# Patient Record
Sex: FEMALE | HISPANIC OR LATINO | Employment: FULL TIME | URBAN - METROPOLITAN AREA
[De-identification: names, ages, dates, MRNs, and addresses within clinical notes are randomized per-mention and may not be internally consistent; named-entity substitution may affect disease eponyms.]

---

## 2018-02-15 ENCOUNTER — APPOINTMENT (OUTPATIENT)
Dept: CT IMAGING | Facility: CLINIC | Age: 33
DRG: 872 | End: 2018-02-15
Attending: EMERGENCY MEDICINE
Payer: COMMERCIAL

## 2018-02-15 ENCOUNTER — APPOINTMENT (OUTPATIENT)
Dept: GENERAL RADIOLOGY | Facility: CLINIC | Age: 33
DRG: 872 | End: 2018-02-15
Attending: EMERGENCY MEDICINE
Payer: COMMERCIAL

## 2018-02-15 ENCOUNTER — HOSPITAL ENCOUNTER (INPATIENT)
Facility: CLINIC | Age: 33
LOS: 3 days | Discharge: HOME | DRG: 872 | End: 2018-02-18
Attending: EMERGENCY MEDICINE | Admitting: INTERNAL MEDICINE
Payer: COMMERCIAL

## 2018-02-15 DIAGNOSIS — R10.31 ABDOMINAL PAIN, RIGHT LOWER QUADRANT: ICD-10-CM

## 2018-02-15 DIAGNOSIS — N39.0 URINARY TRACT INFECTION WITHOUT HEMATURIA, SITE UNSPECIFIED: Primary | ICD-10-CM

## 2018-02-15 DIAGNOSIS — J11.1 INFLUENZA-LIKE ILLNESS: ICD-10-CM

## 2018-02-15 PROBLEM — R68.89 FLU-LIKE SYMPTOMS: Status: ACTIVE | Noted: 2018-02-15

## 2018-02-15 LAB
ALBUMIN SERPL-MCNC: 3.2 G/DL (ref 3.4–5)
ALBUMIN UR-MCNC: 10 MG/DL
ALP SERPL-CCNC: 62 U/L (ref 40–150)
ALT SERPL W P-5'-P-CCNC: 26 U/L (ref 0–50)
ANION GAP SERPL CALCULATED.3IONS-SCNC: 8 MMOL/L (ref 3–14)
APPEARANCE UR: ABNORMAL
AST SERPL W P-5'-P-CCNC: 15 U/L (ref 0–45)
BACTERIA #/AREA URNS HPF: ABNORMAL /HPF
BASOPHILS # BLD AUTO: 0 10E9/L (ref 0–0.2)
BASOPHILS NFR BLD AUTO: 0.4 %
BILIRUB SERPL-MCNC: 0.4 MG/DL (ref 0.2–1.3)
BILIRUB UR QL STRIP: NEGATIVE
BUN SERPL-MCNC: 8 MG/DL (ref 7–30)
CALCIUM SERPL-MCNC: 8.2 MG/DL (ref 8.5–10.1)
CHLORIDE SERPL-SCNC: 106 MMOL/L (ref 94–109)
CO2 BLDCOV-SCNC: 20 MMOL/L (ref 21–28)
CO2 SERPL-SCNC: 24 MMOL/L (ref 20–32)
COLOR UR AUTO: YELLOW
CREAT BLD-MCNC: 0.5 MG/DL (ref 0.52–1.04)
CREAT SERPL-MCNC: 0.56 MG/DL (ref 0.52–1.04)
CRP SERPL-MCNC: 159 MG/L (ref 0–8)
DIFFERENTIAL METHOD BLD: ABNORMAL
EOSINOPHIL # BLD AUTO: 0 10E9/L (ref 0–0.7)
EOSINOPHIL NFR BLD AUTO: 0 %
ERYTHROCYTE [DISTWIDTH] IN BLOOD BY AUTOMATED COUNT: 13.6 % (ref 10–15)
FLUAV+FLUBV AG SPEC QL: NEGATIVE
FLUAV+FLUBV AG SPEC QL: NEGATIVE
FLUAV+FLUBV RNA SPEC QL NAA+PROBE: NEGATIVE
FLUAV+FLUBV RNA SPEC QL NAA+PROBE: NEGATIVE
GFR SERPL CREATININE-BSD FRML MDRD: >90 ML/MIN/1.7M2
GFR SERPL CREATININE-BSD FRML MDRD: >90 ML/MIN/1.7M2
GLUCOSE SERPL-MCNC: 115 MG/DL (ref 70–99)
GLUCOSE UR STRIP-MCNC: NEGATIVE MG/DL
HCT VFR BLD AUTO: 35.5 % (ref 35–47)
HGB BLD-MCNC: 11.5 G/DL (ref 11.7–15.7)
HGB UR QL STRIP: ABNORMAL
HYALINE CASTS #/AREA URNS LPF: 1 /LPF (ref 0–2)
IMM GRANULOCYTES # BLD: 0 10E9/L (ref 0–0.4)
IMM GRANULOCYTES NFR BLD: 0.2 %
KETONES UR STRIP-MCNC: 80 MG/DL
LACTATE BLD-SCNC: 1 MMOL/L (ref 0.7–2.1)
LEUKOCYTE ESTERASE UR QL STRIP: ABNORMAL
LYMPHOCYTES # BLD AUTO: 0.4 10E9/L (ref 0.8–5.3)
LYMPHOCYTES NFR BLD AUTO: 4.6 %
MCH RBC QN AUTO: 26.6 PG (ref 26.5–33)
MCHC RBC AUTO-ENTMCNC: 32.4 G/DL (ref 31.5–36.5)
MCV RBC AUTO: 82 FL (ref 78–100)
MONOCYTES # BLD AUTO: 0.6 10E9/L (ref 0–1.3)
MONOCYTES NFR BLD AUTO: 7.3 %
MUCOUS THREADS #/AREA URNS LPF: PRESENT /LPF
NEUTROPHILS # BLD AUTO: 7 10E9/L (ref 1.6–8.3)
NEUTROPHILS NFR BLD AUTO: 87.5 %
NITRATE UR QL: POSITIVE
NRBC # BLD AUTO: 0 10*3/UL
NRBC BLD AUTO-RTO: 0 /100
PCO2 BLDV: 28 MM HG (ref 40–50)
PH BLDV: 7.46 PH (ref 7.32–7.43)
PH UR STRIP: 6 PH (ref 5–7)
PLATELET # BLD AUTO: 166 10E9/L (ref 150–450)
PO2 BLDV: 31 MM HG (ref 25–47)
POTASSIUM SERPL-SCNC: 3.3 MMOL/L (ref 3.4–5.3)
PROCALCITONIN SERPL-MCNC: 0.66 NG/ML
PROT SERPL-MCNC: 7.3 G/DL (ref 6.8–8.8)
RBC # BLD AUTO: 4.32 10E12/L (ref 3.8–5.2)
RBC #/AREA URNS AUTO: 4 /HPF (ref 0–2)
RSV RNA SPEC NAA+PROBE: NEGATIVE
SAO2 % BLDV FROM PO2: 65 %
SODIUM SERPL-SCNC: 138 MMOL/L (ref 133–144)
SOURCE: ABNORMAL
SP GR UR STRIP: 1.01 (ref 1–1.03)
SPECIMEN SOURCE: NORMAL
SPECIMEN SOURCE: NORMAL
SQUAMOUS #/AREA URNS AUTO: 4 /HPF (ref 0–1)
UROBILINOGEN UR STRIP-MCNC: NORMAL MG/DL (ref 0–2)
WBC # BLD AUTO: 8 10E9/L (ref 4–11)
WBC #/AREA URNS AUTO: 50 /HPF (ref 0–2)

## 2018-02-15 PROCEDURE — 99285 EMERGENCY DEPT VISIT HI MDM: CPT | Mod: 25 | Performed by: EMERGENCY MEDICINE

## 2018-02-15 PROCEDURE — 25000132 ZZH RX MED GY IP 250 OP 250 PS 637: Performed by: PHYSICIAN ASSISTANT

## 2018-02-15 PROCEDURE — 36415 COLL VENOUS BLD VENIPUNCTURE: CPT | Performed by: PHYSICIAN ASSISTANT

## 2018-02-15 PROCEDURE — 85025 COMPLETE CBC W/AUTO DIFF WBC: CPT | Performed by: EMERGENCY MEDICINE

## 2018-02-15 PROCEDURE — 25000128 H RX IP 250 OP 636: Performed by: EMERGENCY MEDICINE

## 2018-02-15 PROCEDURE — 87086 URINE CULTURE/COLONY COUNT: CPT | Performed by: EMERGENCY MEDICINE

## 2018-02-15 PROCEDURE — 99285 EMERGENCY DEPT VISIT HI MDM: CPT | Mod: Z6 | Performed by: EMERGENCY MEDICINE

## 2018-02-15 PROCEDURE — 27210995 ZZH RX 272: Performed by: PHYSICIAN ASSISTANT

## 2018-02-15 PROCEDURE — 25000132 ZZH RX MED GY IP 250 OP 250 PS 637: Performed by: EMERGENCY MEDICINE

## 2018-02-15 PROCEDURE — 83605 ASSAY OF LACTIC ACID: CPT

## 2018-02-15 PROCEDURE — 87040 BLOOD CULTURE FOR BACTERIA: CPT | Performed by: EMERGENCY MEDICINE

## 2018-02-15 PROCEDURE — 96375 TX/PRO/DX INJ NEW DRUG ADDON: CPT | Mod: 59 | Performed by: EMERGENCY MEDICINE

## 2018-02-15 PROCEDURE — 74177 CT ABD & PELVIS W/CONTRAST: CPT

## 2018-02-15 PROCEDURE — 12000001 ZZH R&B MED SURG/OB UMMC

## 2018-02-15 PROCEDURE — 82803 BLOOD GASES ANY COMBINATION: CPT

## 2018-02-15 PROCEDURE — 87088 URINE BACTERIA CULTURE: CPT | Performed by: EMERGENCY MEDICINE

## 2018-02-15 PROCEDURE — 71046 X-RAY EXAM CHEST 2 VIEWS: CPT

## 2018-02-15 PROCEDURE — 87186 SC STD MICRODIL/AGAR DIL: CPT | Performed by: EMERGENCY MEDICINE

## 2018-02-15 PROCEDURE — 81001 URINALYSIS AUTO W/SCOPE: CPT | Performed by: EMERGENCY MEDICINE

## 2018-02-15 PROCEDURE — 96361 HYDRATE IV INFUSION ADD-ON: CPT | Performed by: EMERGENCY MEDICINE

## 2018-02-15 PROCEDURE — 99223 1ST HOSP IP/OBS HIGH 75: CPT | Mod: AI | Performed by: INTERNAL MEDICINE

## 2018-02-15 PROCEDURE — 99207 ZZC APP CREDIT; MD BILLING SHARED VISIT: CPT | Performed by: PHYSICIAN ASSISTANT

## 2018-02-15 PROCEDURE — 25000128 H RX IP 250 OP 636: Performed by: PHYSICIAN ASSISTANT

## 2018-02-15 PROCEDURE — 87804 INFLUENZA ASSAY W/OPTIC: CPT | Performed by: EMERGENCY MEDICINE

## 2018-02-15 PROCEDURE — 96374 THER/PROPH/DIAG INJ IV PUSH: CPT | Mod: 59 | Performed by: EMERGENCY MEDICINE

## 2018-02-15 PROCEDURE — 25000125 ZZHC RX 250: Performed by: EMERGENCY MEDICINE

## 2018-02-15 PROCEDURE — 27210995 ZZH RX 272: Performed by: EMERGENCY MEDICINE

## 2018-02-15 PROCEDURE — 82565 ASSAY OF CREATININE: CPT

## 2018-02-15 PROCEDURE — 86140 C-REACTIVE PROTEIN: CPT | Performed by: PHYSICIAN ASSISTANT

## 2018-02-15 PROCEDURE — 99207 ZZC CDG-CHARGE REQUIRED MANUAL ENTRY: CPT | Performed by: INTERNAL MEDICINE

## 2018-02-15 PROCEDURE — 87633 RESP VIRUS 12-25 TARGETS: CPT | Performed by: PHYSICIAN ASSISTANT

## 2018-02-15 PROCEDURE — 84145 PROCALCITONIN (PCT): CPT | Performed by: PHYSICIAN ASSISTANT

## 2018-02-15 PROCEDURE — 80053 COMPREHEN METABOLIC PANEL: CPT | Performed by: EMERGENCY MEDICINE

## 2018-02-15 PROCEDURE — 87631 RESP VIRUS 3-5 TARGETS: CPT | Performed by: EMERGENCY MEDICINE

## 2018-02-15 RX ORDER — ACETAMINOPHEN 325 MG/1
650 TABLET ORAL EVERY 4 HOURS PRN
Status: DISCONTINUED | OUTPATIENT
Start: 2018-02-15 | End: 2018-02-18 | Stop reason: HOSPADM

## 2018-02-15 RX ORDER — POTASSIUM CHLORIDE 1.5 G/1.58G
20-40 POWDER, FOR SOLUTION ORAL
Status: DISCONTINUED | OUTPATIENT
Start: 2018-02-15 | End: 2018-02-18 | Stop reason: HOSPADM

## 2018-02-15 RX ORDER — IOPAMIDOL 755 MG/ML
71 INJECTION, SOLUTION INTRAVASCULAR ONCE
Status: COMPLETED | OUTPATIENT
Start: 2018-02-15 | End: 2018-02-15

## 2018-02-15 RX ORDER — SODIUM CHLORIDE 9 MG/ML
INJECTION, SOLUTION INTRAVENOUS CONTINUOUS
Status: DISCONTINUED | OUTPATIENT
Start: 2018-02-15 | End: 2018-02-18 | Stop reason: HOSPADM

## 2018-02-15 RX ORDER — POTASSIUM CHLORIDE 750 MG/1
20-40 TABLET, EXTENDED RELEASE ORAL
Status: DISCONTINUED | OUTPATIENT
Start: 2018-02-15 | End: 2018-02-18 | Stop reason: HOSPADM

## 2018-02-15 RX ORDER — KETOROLAC TROMETHAMINE 30 MG/ML
30 INJECTION, SOLUTION INTRAMUSCULAR; INTRAVENOUS EVERY 6 HOURS PRN
Status: DISCONTINUED | OUTPATIENT
Start: 2018-02-15 | End: 2018-02-16

## 2018-02-15 RX ORDER — OSELTAMIVIR PHOSPHATE 75 MG/1
75 CAPSULE ORAL 2 TIMES DAILY
Status: DISCONTINUED | OUTPATIENT
Start: 2018-02-15 | End: 2018-02-18 | Stop reason: HOSPADM

## 2018-02-15 RX ORDER — AMOXICILLIN 250 MG
1 CAPSULE ORAL AT BEDTIME
Status: DISCONTINUED | OUTPATIENT
Start: 2018-02-15 | End: 2018-02-18 | Stop reason: HOSPADM

## 2018-02-15 RX ORDER — ONDANSETRON 2 MG/ML
4 INJECTION INTRAMUSCULAR; INTRAVENOUS EVERY 6 HOURS PRN
Status: DISCONTINUED | OUTPATIENT
Start: 2018-02-15 | End: 2018-02-18 | Stop reason: HOSPADM

## 2018-02-15 RX ORDER — ONDANSETRON 4 MG/1
4 TABLET, ORALLY DISINTEGRATING ORAL EVERY 6 HOURS PRN
Status: DISCONTINUED | OUTPATIENT
Start: 2018-02-15 | End: 2018-02-18 | Stop reason: HOSPADM

## 2018-02-15 RX ORDER — NALOXONE HYDROCHLORIDE 0.4 MG/ML
.1-.4 INJECTION, SOLUTION INTRAMUSCULAR; INTRAVENOUS; SUBCUTANEOUS
Status: DISCONTINUED | OUTPATIENT
Start: 2018-02-15 | End: 2018-02-18 | Stop reason: HOSPADM

## 2018-02-15 RX ORDER — OSELTAMIVIR PHOSPHATE 75 MG/1
75 CAPSULE ORAL ONCE
Status: COMPLETED | OUTPATIENT
Start: 2018-02-15 | End: 2018-02-15

## 2018-02-15 RX ORDER — BISACODYL 10 MG
10 SUPPOSITORY, RECTAL RECTAL DAILY PRN
Status: DISCONTINUED | OUTPATIENT
Start: 2018-02-15 | End: 2018-02-18 | Stop reason: HOSPADM

## 2018-02-15 RX ORDER — SODIUM CHLORIDE 9 MG/ML
INJECTION, SOLUTION INTRAVENOUS CONTINUOUS
Status: DISCONTINUED | OUTPATIENT
Start: 2018-02-15 | End: 2018-02-15

## 2018-02-15 RX ORDER — POLYETHYLENE GLYCOL 3350 17 G/17G
17 POWDER, FOR SOLUTION ORAL DAILY PRN
Status: DISCONTINUED | OUTPATIENT
Start: 2018-02-15 | End: 2018-02-18 | Stop reason: HOSPADM

## 2018-02-15 RX ORDER — IBUPROFEN 600 MG/1
600 TABLET, FILM COATED ORAL EVERY 8 HOURS PRN
Status: DISCONTINUED | OUTPATIENT
Start: 2018-02-15 | End: 2018-02-15

## 2018-02-15 RX ORDER — KETOROLAC TROMETHAMINE 15 MG/ML
15 INJECTION, SOLUTION INTRAMUSCULAR; INTRAVENOUS ONCE
Status: COMPLETED | OUTPATIENT
Start: 2018-02-15 | End: 2018-02-15

## 2018-02-15 RX ORDER — HYDROMORPHONE HYDROCHLORIDE 1 MG/ML
0.5 INJECTION, SOLUTION INTRAMUSCULAR; INTRAVENOUS; SUBCUTANEOUS ONCE
Status: COMPLETED | OUTPATIENT
Start: 2018-02-15 | End: 2018-02-15

## 2018-02-15 RX ADMIN — KETOROLAC TROMETHAMINE 15 MG: 15 INJECTION, SOLUTION INTRAMUSCULAR; INTRAVENOUS at 06:54

## 2018-02-15 RX ADMIN — IOPAMIDOL 71 ML: 755 INJECTION, SOLUTION INTRAVENOUS at 08:07

## 2018-02-15 RX ADMIN — SODIUM CHLORIDE 1000 ML: 9 INJECTION, SOLUTION INTRAVENOUS at 07:41

## 2018-02-15 RX ADMIN — SODIUM CHLORIDE 1000 ML: 9 INJECTION, SOLUTION INTRAVENOUS at 06:54

## 2018-02-15 RX ADMIN — Medication 0.5 MG: at 07:41

## 2018-02-15 RX ADMIN — CEFTRIAXONE SODIUM 1 G: 10 INJECTION, POWDER, FOR SOLUTION INTRAVENOUS at 08:34

## 2018-02-15 RX ADMIN — SODIUM CHLORIDE 58 ML: 9 INJECTION, SOLUTION INTRAVENOUS at 08:07

## 2018-02-15 RX ADMIN — KETOROLAC TROMETHAMINE 30 MG: 30 INJECTION, SOLUTION INTRAMUSCULAR at 20:53

## 2018-02-15 RX ADMIN — OSELTAMIVIR PHOSPHATE 75 MG: 75 CAPSULE ORAL at 20:40

## 2018-02-15 RX ADMIN — ACETAMINOPHEN 650 MG: 325 TABLET, FILM COATED ORAL at 20:52

## 2018-02-15 RX ADMIN — CEFTRIAXONE SODIUM 1 G: 10 INJECTION, POWDER, FOR SOLUTION INTRAVENOUS at 13:07

## 2018-02-15 RX ADMIN — KETOROLAC TROMETHAMINE 30 MG: 30 INJECTION, SOLUTION INTRAMUSCULAR at 13:05

## 2018-02-15 RX ADMIN — OSELTAMIVIR PHOSPHATE 75 MG: 75 CAPSULE ORAL at 08:34

## 2018-02-15 RX ADMIN — SENNOSIDES AND DOCUSATE SODIUM 1 TABLET: 8.6; 5 TABLET ORAL at 21:06

## 2018-02-15 RX ADMIN — ACETAMINOPHEN 650 MG: 325 TABLET, FILM COATED ORAL at 16:38

## 2018-02-15 RX ADMIN — SODIUM CHLORIDE: 9 INJECTION, SOLUTION INTRAVENOUS at 09:07

## 2018-02-15 NOTE — PLAN OF CARE
Problem: Infection, Risk/Actual (Adult)  Goal: Identify Related Risk Factors and Signs and Symptoms  Related risk factors and signs and symptoms are identified upon initiation of Human Response Clinical Practice Guideline (CPG).  Outcome: No Change    VS: Tachycardic    O2: Mid 90's on RA    Output: Voiding adequate amts in bathroom    Last BM: 2/14   Activity: Ambulated with SBA    Skin: WDL    Pain: Moderately controlled with Toradol and acetaminophen    CMS: Intact    Dressing: NA    Diet: Tolerating regular diet with minimal appetite.    LDA: PIV infusing NS    Equipment: NA    Plan: Continue to determine etiology and await cultures.    Additional Info:

## 2018-02-15 NOTE — ED PROVIDER NOTES
History     Chief Complaint   Patient presents with     Flu Symptoms     started on monday and getting worst, took tylenol and ibuprofen around midnight, chills, fever, headache and body aches     HPI  Minnie Arambula is a 32 year old female who presents with cough fever headache and body aches.  She has had these symptoms for the past 2 days.  Tonight she started to feel worse and presented to the ER.  She has no sick contacts and no recent travel.  She did not receive her influenza vaccine.  She reports some urinary frequency and and also some nausea and vomiting.  Her immunizations are current.  She has no chest pain or shortness of breath.    PAST MEDICAL HISTORY:   Past Medical History:   Diagnosis Date     Fibroid uterus      Giant cell tumor of bone     Left Femur in        PAST SURGICAL HISTORY:   Past Surgical History:   Procedure Laterality Date      SECTION      X4     LEG SURGERY      left     REMOVE HARDWARE LOWER EXTREMITY  2011    Procedure:REMOVE HARDWARE LOWER EXTREMITY; Plate Removal Distal Femur; Surgeon:YESY WALLS; Location:[facility] OR     TUBAL LIGATION         FAMILY HISTORY:   Family History   Problem Relation Age of Onset     DIABETES Father      Hypertension Mother        SOCIAL HISTORY:   Social History   Substance Use Topics     Smoking status: Never Smoker     Smokeless tobacco: Never Used     Alcohol use Yes      Comment: occ       Patient's Medications   New Prescriptions    No medications on file   Previous Medications    HYDROCODONE-ACETAMINOPHEN (NORCO) 5-325 MG PER TABLET    Take 1-2 tablets by mouth every 4 hours as needed for moderate to severe pain    IBUPROFEN (ADVIL,MOTRIN) 600 MG TABLET    Take 1 tablet (600 mg) by mouth every 8 hours as needed for moderate pain    POLYETHYLENE GLYCOL (MIRALAX) POWDER    Take 17 g (1 capful) by mouth daily   Modified Medications    No medications on file   Discontinued Medications    No medications on file           Allergies   Allergen Reactions     Pcn [Bicillin C-R,] Shortness Of Breath     Sulfa Drugs Rash         I have reviewed the Medications, Allergies, Past Medical and Surgical History, and Social History in the Epic system.    Review of Systems   All other systems reviewed and are negative.      Physical Exam   BP: 115/64  Heart Rate: 126  Temp: 102.9  F (39.4  C)  Resp: 20  SpO2: 99 %      Physical Exam   Constitutional: She is oriented to person, place, and time. No distress.   HENT:   Head: Normocephalic and atraumatic.   Right Ear: External ear normal.   Left Ear: External ear normal.   Mouth/Throat: Oropharynx is clear and moist. No oropharyngeal exudate.   Eyes: Conjunctivae are normal. Pupils are equal, round, and reactive to light.   Neck: Normal range of motion. Neck supple.   Cardiovascular: Intact distal pulses.    tachycardic   Pulmonary/Chest: Effort normal. No respiratory distress. She has no wheezes. She has no rales. She exhibits no tenderness.   Abdominal: There is no tenderness. There is no rebound and no guarding.   Right lower abdominal tenderness   Musculoskeletal: Normal range of motion. She exhibits no edema or tenderness.   Neurological: She is alert and oriented to person, place, and time. She exhibits normal muscle tone.   Skin: Skin is warm and dry. No rash noted. She is not diaphoretic.   Psychiatric: She has a normal mood and affect. Her behavior is normal. Thought content normal.   Nursing note and vitals reviewed.      ED Course     ED Course     Procedures                Critical Care time:  none   2          Labs Ordered and Resulted from Time of ED Arrival Up to the Time of Departure from the ED   CBC WITH PLATELETS DIFFERENTIAL   COMPREHENSIVE METABOLIC PANEL   ROUTINE UA WITH MICROSCOPIC   ISTAT CG4 GASES LACTATE ITALO NURSING POCT   ISTAT CREATININE NURSING POCT   INFLUENZA A/B ANTIGEN   BLOOD CULTURE   URINE CULTURE AEROBIC BACTERIAL            Assessments & Plan (with  Medical Decision Making)   1.  UTI    32-year-old female who presents with fever, body aches and headache.  On examination, she is febrile to 102.9 and tachycardic in the 120s.  She is normotensive.  Influenza is negative, but still suspicious for an influenza like illness, and will treat with Tamiflu.  An Influenza and RSV PCR is pending.  Her lab evaluation was otherwise normal with a lactic acid of 1.0 and a WBC of 8.0.  On her exam, she demonstrated some right sided tenderness and we will obtain a CT AP to further evaluate. Urinalysis consistent with infection with 50 WBCs Pos Nitrites and + LE.  WIll treat with Rocephin.   Minnie felt improved after IV dilaudid and Toradol.  Blood and urine cultures are pending.  Will sign out patient to oncoming provider to follow up CT AP prior to admission.             I have reviewed the nursing notes.    I have reviewed the findings, diagnosis, plan and need for follow up with the patient.    New Prescriptions    No medications on file       Final diagnoses:   None       2/15/2018   [facility], [place], EMERGENCY DEPARTMENT     Catalino Allen MD  02/21/18 2053       Catalino Allen MD  02/21/18 2054

## 2018-02-15 NOTE — IP AVS SNAPSHOT
MRN:[id number]                      After Visit Summary   2/15/2018    Minnie Arambula    MRN: [id number]           Thank you!     Thank you for choosing [facility] for your care. Our goal is always to provide you with excellent care. Hearing back from our patients is one way we can continue to improve our services. Please take a few minutes to complete the written survey that you may receive in the mail after you visit with us. Thank you!        Patient Information     Date Of Birth          1985        Designated Caregiver       Most Recent Value    Caregiver    Will someone help with your care after discharge? no      About your hospital stay     You were admitted on:  February 15, 2018 You last received care in the:  [facility]    You were discharged on:  February 18, 2018        Reason for your hospital stay       You were hospitalized with a high fever, likely secondary to a urinary tract infection.                  Who to Call     For medical emergencies, please call 911.  For non-urgent questions about your medical care, please call your primary care provider or clinic, [phone]          Attending Provider     Provider Specialty    Catalino Allen MD Emergency Medicine    Ene Acuna MD Emergency Medicine    Carol, Elier Kemp MD Internal Medicine       Primary Care Provider Office Phone # Fax #    Laurie BallNA [phone]      Follow-up Appointments     Adult [facility] Follow-up and recommended labs and tests       Call primary provider if you develop recurrence of abdominal or flank pain, fevers, burning with urination or severe diarrhea  You should see your primary provider in 1-2 weeks for a routine follow up  You should have a hemoglobin rechecked, as your blood count was low (9.0) in the setting of acute infection.                  Pending Results     Date and Time Order Name Status Description    2/15/2018 0623 Blood culture ONE site  "Preliminary             Statement of Approval     Ordered          18 0927  I have reviewed and agree with all the recommendations and orders detailed in this document.  EFFECTIVE NOW     Approved and electronically signed by:  Elier Medina MD             Admission Information     Date & Time Provider Department Dept. Phone    2/15/2018 Elier Medina MD [facility] [phone]      Your Vitals Were     Blood Pressure Pulse Temperature Respirations Last Period Pulse Oximetry    108/71 (BP Location: Right arm) 84 98.7  F (37.1  C) (Oral) 14 2018 (Approximate) 95%      MyChart Information     [email or web address] lets you send messages to your doctor, view your test results, renew your prescriptions, schedule appointments and more. To sign up, go to [email or web address] . Click on \"Log in\" on the left side of the screen, which will take you to the Welcome page. Then click on \"Sign up Now\" on the right side of the page.     You will be asked to enter the access code listed below, as well as some personal information. Please follow the directions to create your username and password.     Your access code is: [email or web address]  Expires: 2018  9:35 AM     Your access code will  in 90 days. If you need help or a new code, please call your [facility] clinic or [phone].        Care EveryWhere ID     This is your Care EveryWhere ID. This could be used by other organizations to access your [facility] medical records  [id number]        Equal Access to Services     [facility]: Hadii aad ku hadasho Soomaali, waaxda luqadaha, qaybta kaalmada adeegyada, fe rankin . So [facility] [phone].    ATENCIÓN: Si habla español, tiene a stuart disposición servicios gratuitos de asistencia lingüística. Llame al [phone].    We comply with applicable federal civil rights laws and [place] laws. We do not discriminate on the basis of race, color, national origin, age, disability, sex, " sexual orientation, or gender identity.               Review of your medicines      START taking        Dose / Directions    ciprofloxacin 500 MG tablet   Commonly known as:  CIPRO   Used for:  Urinary tract infection without hematuria, site unspecified        Dose:  500 mg   Take 1 tablet (500 mg) by mouth 2 times daily   Quantity:  14 tablet   Refills:  0         CONTINUE these medicines which have NOT CHANGED        Dose / Directions    ibuprofen 600 MG tablet   Commonly known as:  ADVIL/MOTRIN        Dose:  600 mg   Take 1 tablet (600 mg) by mouth every 8 hours as needed for moderate pain   Quantity:  20 tablet   Refills:  0         STOP taking     HYDROcodone-acetaminophen 5-325 MG per tablet   Commonly known as:  NORCO           polyethylene glycol powder   Commonly known as:  MIRALAX                Where to get your medicines      These medications were sent to [facility] [place], [place] - [address]  [address] [address]     Phone:  [phone]     ciprofloxacin 500 MG tablet                Protect others around you: Learn how to safely use, store and throw away your medicines at www.disposemymeds.org.        ANTIBIOTIC INSTRUCTION     You've Been Prescribed an Antibiotic - Now What?  Your healthcare team thinks that you or your loved one might have an infection. Some infections can be treated with antibiotics, which are powerful, life-saving drugs. Like all medications, antibiotics have side effects and should only be used when necessary. There are some important things you should know about your antibiotic treatment.      Your healthcare team may run tests before you start taking an antibiotic.    Your team may take samples (e.g., from your blood, urine or other areas) to run tests to look for bacteria. These test can be important to determine if you need an antibiotic at all and, if you do, which antibiotic will work best.      Within a few days, your  healthcare team might change or even stop your antibiotic.    Your team may start you on an antibiotic while they are working to find out what is making you sick.    Your team might change your antibiotic because test results show that a different antibiotic would be better to treat your infection.    In some cases, once your team has more information, they learn that you do not need an antibiotic at all. They may find out that you don't have an infection, or that the antibiotic you're taking won't work against your infection. For example, an infection caused by a virus can't be treated with antibiotics. Staying on an antibiotic when you don't need it is more likely to be harmful than helpful.      You may experience side effects from your antibiotic.    Like all medications, antibiotics have side effects. Some of these can be serious.    Let you healthcare team know if you have any known allergies when you are admitted to the hospital.    One significant side effect of nearly all antibiotics is the risk of severe and sometimes deadly diarrhea caused by Clostridium difficile (C. Difficile). This occurs when a person takes antibiotics because some good germs are destroyed. Antibiotic use allows C. diificile to take over, putting patients at high risk for this serious infection.    As a patient or caregiver, it is important to understand your or your loved one's antibiotic treatment. It is especially important for caregivers to speak up when patients can't speak for themselves. Here are some important questions to ask your healthcare team.    What infection is this antibiotic treating and how do you know I have that infection?    What side effects might occur from this antibiotic?    How long will I need to take this antibiotic?    Is it safe to take this antibiotic with other medications or supplements (e.g., vitamins) that I am taking?     Are there any special directions I need to know about taking this antibiotic?  For example, should I take it with food?    How will I be monitored to know whether my infection is responding to the antibiotic?    What tests may help to make sure the right antibiotic is prescribed for me?      Information provided by:  www.cdc.gov/getsmart  U.S. Department of Health and Human Services  Centers for disease Control and Prevention  National Center for Emerging and Zoonotic Infectious Diseases  Division of Healthcare Quality Promotion             Medication List: This is a list of all your medications and when to take them. Check marks below indicate your daily home schedule. Keep this list as a reference.      Medications           Morning Afternoon Evening Bedtime As Needed    ciprofloxacin 500 MG tablet   Commonly known as:  CIPRO   Take 1 tablet (500 mg) by mouth 2 times daily                                ibuprofen 600 MG tablet   Commonly known as:  ADVIL/MOTRIN   Take 1 tablet (600 mg) by mouth every 8 hours as needed for moderate pain   Last time this was given:  400 mg on 2/17/2018  4:22 PM

## 2018-02-15 NOTE — ED NOTES
[facility], [place]   ED Nurse to Floor Handoff     Minnie Arambula is a 32 year old female who speaks Citizen of the [place] and fluent in English, lives with a spouse,  in a home  They arrived in the ED by car from home    ED Chief Complaint: Flu Symptoms (started on monday and getting worst, took tylenol and ibuprofen around midnight, chills, fever, headache and body aches)    ED Dx;   Final diagnoses:   Influenza-like illness         Needed?: No    Allergies:   Allergies   Allergen Reactions     Pcn [Bicillin C-R,] Shortness Of Breath     Sulfa Drugs Rash   .  Past Medical Hx:   Past Medical History:   Diagnosis Date     Fibroid uterus      Giant cell tumor of bone     Left Femur in 2004      Baseline Mental status: WDL  Current Mental Status changes: at basesline    Infection: Yes  Sepsis suspected: No  Isolation type: Droplet     Activity level - Baseline/Home:  Independent  Activity Level - Current:   Independent    Bariatric equipment needed?: No    In the ED these meds were given:   Medications   0.9% sodium chloride infusion ( Intravenous New Bag 2/15/18 0907)   0.9% sodium chloride BOLUS (0 mLs Intravenous Stopped 2/15/18 0740)   ketorolac (TORADOL) injection 15 mg (15 mg Intravenous Given 2/15/18 0654)   0.9% sodium chloride BOLUS (1,000 mLs Intravenous New Bag 2/15/18 0741)   HYDROmorphone (PF) (DILAUDID) injection 0.5 mg (0.5 mg Intravenous Given 2/15/18 0741)   iopamidol (ISOVUE-370) solution 71 mL (71 mLs Intravenous Given 2/15/18 0807)   sodium chloride 0.9 % bag 500mL for CT scan flush use (58 mLs As instructed Given 2/15/18 0807)   oseltamivir (TAMIFLU) capsule 75 mg (75 mg Oral Given 2/15/18 0834)   cefTRIAXone (ROCEPHIN) 1 g in 10 mL SWFI Premix Syringe (1 g Intravenous Given 2/15/18 0834)       Drips running?  Yes    Home pump or pre-existing LDA's present? No    Labs results:   Labs Ordered and Resulted from Time of ED Arrival Up to the Time of Departure  from the ED   CBC WITH PLATELETS DIFFERENTIAL - Abnormal; Notable for the following:        Result Value    Hemoglobin 11.5 (*)     Absolute Lymphocytes 0.4 (*)     All other components within normal limits   COMPREHENSIVE METABOLIC PANEL - Abnormal; Notable for the following:     Potassium 3.3 (*)     Glucose 115 (*)     Calcium 8.2 (*)     Albumin 3.2 (*)     All other components within normal limits   ROUTINE UA WITH MICROSCOPIC - Abnormal; Notable for the following:     Ketones Urine 80 (*)     Blood Urine Trace (*)     Protein Albumin Urine 10 (*)     Nitrite Urine Positive (*)     Leukocyte Esterase Urine Moderate (*)     WBC Urine 50 (*)     RBC Urine 4 (*)     Bacteria Urine Many (*)     Squamous Epithelial /HPF Urine 4 (*)     Mucous Urine Present (*)     All other components within normal limits   CREATININE POCT - Abnormal; Notable for the following:     Creatinine 0.5 (*)     All other components within normal limits   ISTAT  GASES LACTATE ITALO POCT - Abnormal; Notable for the following:     Ph Venous 7.46 (*)     PCO2 Venous 28 (*)     Bicarbonate Venous 20 (*)     All other components within normal limits   ISTAT CG4 GASES LACTATE ITALO NURSING POCT   ISTAT CREATININE NURSING POCT   PATIENT CARE ORDER   INFLUENZA A/B ANTIGEN   BLOOD CULTURE   URINE CULTURE AEROBIC BACTERIAL   INFLUENZA A AND B AND RSV PCR       Imaging Studies:   Recent Results (from the past 24 hour(s))   CT Abdomen Pelvis w Contrast    Narrative    CT ABDOMEN AND PELVIS WITH CONTRAST  2/15/2018 8:15 AM    HISTORY: Right lower quadrant pain.     COMPARISON: A CT on 5/2/2016.    TECHNIQUE: Routine transverse CT imaging of the abdomen and pelvis was  performed following the uneventful administration of 71 mL Isovue 370  intravenous contrast. Radiation dose for this scan was reduced using  automated exposure control, adjustment of the mA and/or kV according  to patient size, or iterative reconstruction technique.    FINDINGS: There is mild  dependent atelectasis in both lung bases.  Again seen are a few tiny low-density lesions within the liver. These  are again too small to evaluate but are unchanged and likely represent  cysts. No other hepatic abnormality is noted. The spleen, pancreas,  gallbladder, and adrenal glands are normal. There has been development  of a few very small areas of decreased attenuation in the lateral and  superior aspect of the left upper kidney along the cortex. These are  too small to further evaluate but likely represents cysts. No other  renal abnormality is demonstrated. The bladder is unremarkable. Again  seen are several small uterine myomas. No enlarged lymph node or other  abnormal mass is demonstrated. No free fluid is seen. No free  intraperitoneal gas is identified. The gastrointestinal tract is  unremarkable. There is a normal-appearing appendix. No vascular  abnormality is seen. The osseous structures are unremarkable. No  abdominal or pelvic wall pathology is demonstrated.       Impression    IMPRESSION:   1. Again seen are several small uterine myomas.  2. Probable small left renal cysts.  3. Otherwise unremarkable examination.    ZOEY ALEJANDRO MD   Chest XR,  PA & LAT    Narrative    CHEST TWO VIEWS   2/15/2018 8:19 AM    HISTORY:  Fever.     COMPARISON:  2/17/2011      Impression    IMPRESSION:  Negative.        Recent vital signs:   BP 96/55  Pulse 103  Temp 99.2  F (37.3  C) (Oral)  Resp 18  LMP 02/01/2018 (Approximate)  SpO2 97%  Breastfeeding? No    Cardiac Rhythm: Tachycardia  Pt needs tele? No  Skin/wound Issues: None    Code Status: Full Code    Pain control: good    Nausea control: pt had none    Abnormal labs/tests/findings requiring intervention: UTI, started on antibiotics.     Family present during ED course? Yes   Family Comments/Social Situation comments: n/a    Tasks needing completion: None    Purnima Orellana, RN  [facility]-- [phone] [place] [facility]  [phone] [facility] ED

## 2018-02-15 NOTE — H&P
[facility], [place]    Internal Medicine History and Physical - [facility] Service       Date of Admission:  2/15/2018    Assessment & Plan   Minnie Arambula is a 32 year old female with a history of giant cell tumor of L femur (2004) and uterine fibroids admitted on 2/15/2018 for sepsis 2/2 suspected UTI and viral Illness of unknown etiology.    Sepsis 2/2 Suspected Urinary Tract Infection, Viral Illness. Endorses 4 day history of high fever, diffuse body aches, and dysuria. No ill contacts. Denies diarrhea, cough, sore throat, congestion, confusion. UA w/ moderate LE, positive nitrite, but also 4 squamous cells. CT A/P w/o hydronephrosis or stranding or ureterolithiasis - possible renal cyst noted. No leukocytosis. No CVA tenderness although endorses R sided flank pain, suspect muscular in nature. No nuchal rigidity or AMS to suggest meningitis. Tachycardic, mildly tachypneic, and T-max 102.9 this AM meeting sepsis criteria - lactate wnl.  - Continue Ceftriaxone 2g for suspected UTI  (documented h/o penicillin allergy but tolerated Ceftriaxone this AM)  - Continue Tamiflu BID x 5 days for suspected Influenza  - Pain: APAP and Ketorolac PRN  - Continue IVF, droplet isolation  - Follow urine & blood culture  - Add on Respiratory Viral Panel PCR  - Trend CRP, Procal, CBC, BMP    Acute Constipation. No BM since Monday. Denies diarrhea prior to that. Passing gas, but endorses nausea and R sided abdominal pain. CT showed unremarkable GI tract and appendix, no suspicion for SBO.  - Senna at bedtime  - Miralax PRN   - Suppository available    Pain Assessment:  RLQ abdominal pain 8/10.  Current Pain Score 7/27/2015 7/27/2015 7/26/2015   Pain score (0-10) - - -   Pain location Abdomen Abdomen Abdomen   Pain descriptors Aching;Cramping - (No Data)   - Minnie is experiencing pain due to muscular irritation and body aches. Pain management was discussed and the plan was created  in a collaborative fashion.  Minnie's response to the current recommendations: compliant  - Please see the plan for pain management as documented above    Diet: Combination Diet Regular Diet Adult  Fluids: IVF  DVT Prophylaxis: Low Risk/Ambulatory with no VTE prophylaxis indicated and Pneumatic Compression Devices  Code Status: Full Code    Disposition Plan   Expected discharge: 2 - 3 days; recommended to prior living arrangement once adequate pain management/ tolerating PO medications, antibiotic plan established and SIRS/Sepsis treated.     Entered: Mukund Hester 02/15/2018, 11:26 AM   Information in the above section will display in the discharge planner report.    The patient's care was discussed with the Attending Physician, Dr. Medina, Bedside Nurse and Patient.    Mukund Hester  Internal Medicine Staff Hospitalist Service  [facility]  Pager: [phone]  Please see sticky note for cross cover information  ______________________________________________________________________    Chief Complaint   Myalgias and fever    History is obtained from the patient    History of Present Illness   Minnie Arambula is a 32 year old female  who has a history of giant cell tumor of L femur (2004) and uterine fibroids and is admitted for sepsis 2/2 viral illness and suspected UTI.    Minnie endorses body aches, fever (max 104), and dysuria since Monday. 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 She has four children and they are not sick. She endorses R sided abdominal pain, worse with movement and palpation. Pain is sharp in nature, 8/10. She has her appendix and gallbladder. PTA she was taking ibuprofen and tylenol. She has not eaten much in the past few days. She has been trying to drink water.    She denies a sore throat, dyspnea, chest pain, vision changes, diarrhea.    Review of Systems   The 10 point Review of Systems is negative other than noted in the HPI or here.       Past Medical History    I have reviewed this patient's medical history and updated it with pertinent information if needed.   Past Medical History:   Diagnosis Date     Fibroid uterus      Giant cell tumor of bone     Left Femur in 2004         Past Surgical History   I have reviewed this patient's surgical history and updated it with pertinent information if needed.  Past Surgical History:   Procedure Laterality Date      SECTION      X4     LEG SURGERY      left     REMOVE HARDWARE LOWER EXTREMITY  2011    Procedure:REMOVE HARDWARE LOWER EXTREMITY; Plate Removal Distal Femur; Surgeon:YESY WALLS; Location:[facility] OR     TUBAL LIGATION           Social History   Social History   Substance Use Topics     Smoking status: Never Smoker     Smokeless tobacco: Never Used     Alcohol use Yes      Comment: occ        Family History   I have reviewed this patient's family history and updated it with pertinent information if needed.   Family History   Problem Relation Age of Onset     Hypertension Mother      DIABETES Father         Prior to Admission Medications   Prior to Admission Medications   Prescriptions Last Dose Informant Patient Reported? Taking?   HYDROcodone-acetaminophen (NORCO) 5-325 MG per tablet Unknown at Unknown time  No No   Sig: Take 1-2 tablets by mouth every 4 hours as needed for moderate to severe pain   ibuprofen (ADVIL,MOTRIN) 600 MG tablet Unknown at Unknown time  No No   Sig: Take 1 tablet (600 mg) by mouth every 8 hours as needed for moderate pain   polyethylene glycol (MIRALAX) powder Unknown at Unknown time  No No   Sig: Take 17 g (1 capful) by mouth daily      Facility-Administered Medications: None     Allergies   Allergies   Allergen Reactions     Pcn [Bicillin C-R,] Shortness Of Breath     Sulfa Drugs Rash       Physical Exam   Vital Signs: Temp: 99.8  F (37.7  C) Temp src: Oral BP: 107/73 Pulse: 114 Heart Rate: 114 Resp: 18 SpO2: 99 % O2 Device: None (Room air)     Weight: 0 lbs 0 oz    General Appearance: A&Ox3. Ill appearing female in mild distress.  Eyes: eomi, Perrla, eyes free of discharge bilaterally.  HEENT: Head atraumatic, normocephalic. MMM. Tongue midline.   Respiratory: Normal effort on RA. Lungs clear bilaterally without wheezing, rales or rhonchi.  Cardiovascular: Tachycardic rate, regular rhythm. No murmurs noted.  GI: Abdomen soft, tender in the RLQ, and non-distended. Bowel sounds present.  Lymph/Hematologic: No peripheral edema.  Genitourinary: Deferred.  Skin: No acute rashes on exposed areas of skin.  Musculoskeletal: Diffuse muscle tenderness. No nuchal rigidity.  Neurologic: CNII-XII intact.    Data   Data reviewed today: I reviewed all medications, new labs and imaging results over the last 24 hours. I personally reviewed the chest x-ray image(s) showing no acute process and the abdominal CT image(s) showing unremarkable aside from possible renal cyst.    Data     Recent Labs  Lab 02/15/18  0648   WBC 8.0   HGB 11.5*   MCV 82         POTASSIUM 3.3*   CHLORIDE 106   CO2 24   BUN 8   CR 0.56   ANIONGAP 8   IDALIA 8.2*   *   ALBUMIN 3.2*   PROTTOTAL 7.3   BILITOTAL 0.4   ALKPHOS 62   ALT 26   AST 15     Recent Results (from the past 24 hour(s))   CT Abdomen Pelvis w Contrast    Narrative    CT ABDOMEN AND PELVIS WITH CONTRAST  2/15/2018 8:15 AM    HISTORY: Right lower quadrant pain.     COMPARISON: A CT on 5/2/2016.    TECHNIQUE: Routine transverse CT imaging of the abdomen and pelvis was  performed following the uneventful administration of 71 mL Isovue 370  intravenous contrast. Radiation dose for this scan was reduced using  automated exposure control, adjustment of the mA and/or kV according  to patient size, or iterative reconstruction technique.    FINDINGS: There is mild dependent atelectasis in both lung bases.  Again seen are a few tiny low-density lesions within the liver. These  are again too small to evaluate but are unchanged  and likely represent  cysts. No other hepatic abnormality is noted. The spleen, pancreas,  gallbladder, and adrenal glands are normal. There has been development  of a few very small areas of decreased attenuation in the lateral and  superior aspect of the left upper kidney along the cortex. These are  too small to further evaluate but likely represents cysts. No other  renal abnormality is demonstrated. The bladder is unremarkable. Again  seen are several small uterine myomas. No enlarged lymph node or other  abnormal mass is demonstrated. No free fluid is seen. No free  intraperitoneal gas is identified. The gastrointestinal tract is  unremarkable. There is a normal-appearing appendix. No vascular  abnormality is seen. The osseous structures are unremarkable. No  abdominal or pelvic wall pathology is demonstrated.       Impression    IMPRESSION:   1. Again seen are several small uterine myomas.  2. Probable small left renal cysts.  3. Otherwise unremarkable examination.    ZOEY ALEJANDRO MD   Chest XR,  PA & LAT    Narrative    CHEST TWO VIEWS   2/15/2018 8:19 AM    HISTORY:  Fever.     COMPARISON:  2/17/2011      Impression    IMPRESSION:  Negative.

## 2018-02-15 NOTE — IP AVS SNAPSHOT
[facility]    [address]    [place] [address]    Phone:  [phone]                                       After Visit Summary   2/15/2018    Minnie Arambula    MRN: [id number]           After Visit Summary Signature Page     I have received my discharge instructions, and my questions have been answered. I have discussed any challenges I see with this plan with the nurse or doctor.    ..........................................................................................................................................  Patient/Patient Representative Signature      ..........................................................................................................................................  Patient Representative Print Name and Relationship to Patient    ..................................................               ................................................  Date                                            Time    ..........................................................................................................................................  Reviewed by Signature/Title    ...................................................              ..............................................  Date                                                            Time

## 2018-02-15 NOTE — ED PROVIDER NOTES
Sign out note: Minnie Arambula is a 32 year old female was signed out to me by Dr. Allen at 0800am.  Please see initial dictation for full details and history.  Patient has a fever and symptoms consistent with influenza and RLQ abdominal pain .  Plan at time of sign out is to admit the patient on an observation basis to the hospitalist service.  A CT of the abdomen and pelvis is pending at this time.    Course in the ED:  CT abdomen and pelvis shows 1. Again seen are several small uterine myomas.  2. Probable small left renal cysts.  3. Otherwise unremarkable examination.  CXR is unremarkable.  The patient received a dose of Tamiflu in the ED as well as Rocephin IV for probable UTI. She has received 2 liters of normal saline IV bolus and is currently on maintenance normal saline IV.   I spoke with Dr. Medina, hospitalist, regarding the results of the CT scan.  The patient is admitted to the hospitalist service for further evaluation and treatment.    MD Armand Morales Alda L, MD  02/15/18 [id number]

## 2018-02-16 LAB
ANION GAP SERPL CALCULATED.3IONS-SCNC: 5 MMOL/L (ref 3–14)
BUN SERPL-MCNC: 7 MG/DL (ref 7–30)
CALCIUM SERPL-MCNC: 7.2 MG/DL (ref 8.5–10.1)
CHLORIDE SERPL-SCNC: 116 MMOL/L (ref 94–109)
CO2 SERPL-SCNC: 22 MMOL/L (ref 20–32)
CREAT SERPL-MCNC: 0.45 MG/DL (ref 0.52–1.04)
CRP SERPL-MCNC: 155 MG/L (ref 0–8)
ERYTHROCYTE [DISTWIDTH] IN BLOOD BY AUTOMATED COUNT: 14 % (ref 10–15)
FLUAV H1 2009 PAND RNA SPEC QL NAA+PROBE: NEGATIVE
FLUAV H1 RNA SPEC QL NAA+PROBE: NEGATIVE
FLUAV H3 RNA SPEC QL NAA+PROBE: NEGATIVE
FLUAV RNA SPEC QL NAA+PROBE: NEGATIVE
FLUBV RNA SPEC QL NAA+PROBE: NEGATIVE
GFR SERPL CREATININE-BSD FRML MDRD: >90 ML/MIN/1.7M2
GLUCOSE SERPL-MCNC: 90 MG/DL (ref 70–99)
HADV DNA SPEC QL NAA+PROBE: NEGATIVE
HADV DNA SPEC QL NAA+PROBE: NEGATIVE
HCT VFR BLD AUTO: 28.2 % (ref 35–47)
HGB BLD-MCNC: 9.2 G/DL (ref 11.7–15.7)
HMPV RNA SPEC QL NAA+PROBE: NEGATIVE
HPIV1 RNA SPEC QL NAA+PROBE: NEGATIVE
HPIV2 RNA SPEC QL NAA+PROBE: NEGATIVE
HPIV3 RNA SPEC QL NAA+PROBE: NEGATIVE
LACTATE BLD-SCNC: 1.3 MMOL/L (ref 0.4–1.9)
MCH RBC QN AUTO: 26.8 PG (ref 26.5–33)
MCHC RBC AUTO-ENTMCNC: 32.6 G/DL (ref 31.5–36.5)
MCV RBC AUTO: 82 FL (ref 78–100)
MICROBIOLOGIST REVIEW: NORMAL
PLATELET # BLD AUTO: 105 10E9/L (ref 150–450)
POTASSIUM SERPL-SCNC: 3.7 MMOL/L (ref 3.4–5.3)
PROCALCITONIN SERPL-MCNC: 0.47 NG/ML
RBC # BLD AUTO: 3.43 10E12/L (ref 3.8–5.2)
RHINOVIRUS RNA SPEC QL NAA+PROBE: NEGATIVE
RSV RNA SPEC QL NAA+PROBE: NEGATIVE
RSV RNA SPEC QL NAA+PROBE: NEGATIVE
SODIUM SERPL-SCNC: 143 MMOL/L (ref 133–144)
SPECIMEN SOURCE: NORMAL
WBC # BLD AUTO: 5.6 10E9/L (ref 4–11)

## 2018-02-16 PROCEDURE — 86140 C-REACTIVE PROTEIN: CPT | Performed by: PHYSICIAN ASSISTANT

## 2018-02-16 PROCEDURE — 36415 COLL VENOUS BLD VENIPUNCTURE: CPT | Performed by: PHYSICIAN ASSISTANT

## 2018-02-16 PROCEDURE — 12000001 ZZH R&B MED SURG/OB UMMC

## 2018-02-16 PROCEDURE — 25000132 ZZH RX MED GY IP 250 OP 250 PS 637: Performed by: PHYSICIAN ASSISTANT

## 2018-02-16 PROCEDURE — 84145 PROCALCITONIN (PCT): CPT | Performed by: PHYSICIAN ASSISTANT

## 2018-02-16 PROCEDURE — 25000128 H RX IP 250 OP 636: Performed by: EMERGENCY MEDICINE

## 2018-02-16 PROCEDURE — 99232 SBSQ HOSP IP/OBS MODERATE 35: CPT | Performed by: PHYSICIAN ASSISTANT

## 2018-02-16 PROCEDURE — 25000128 H RX IP 250 OP 636: Performed by: PHYSICIAN ASSISTANT

## 2018-02-16 PROCEDURE — 25000125 ZZHC RX 250: Performed by: PHYSICIAN ASSISTANT

## 2018-02-16 PROCEDURE — 85027 COMPLETE CBC AUTOMATED: CPT | Performed by: PHYSICIAN ASSISTANT

## 2018-02-16 PROCEDURE — 36415 COLL VENOUS BLD VENIPUNCTURE: CPT | Performed by: INTERNAL MEDICINE

## 2018-02-16 PROCEDURE — 83605 ASSAY OF LACTIC ACID: CPT | Performed by: INTERNAL MEDICINE

## 2018-02-16 PROCEDURE — 99207 ZZC CDG-MDM COMPONENT: MEETS MODERATE - UP CODED: CPT | Performed by: PHYSICIAN ASSISTANT

## 2018-02-16 PROCEDURE — 80048 BASIC METABOLIC PNL TOTAL CA: CPT | Performed by: PHYSICIAN ASSISTANT

## 2018-02-16 RX ORDER — IBUPROFEN 200 MG
400 TABLET ORAL EVERY 6 HOURS PRN
Status: DISCONTINUED | OUTPATIENT
Start: 2018-02-16 | End: 2018-02-18 | Stop reason: HOSPADM

## 2018-02-16 RX ORDER — CEFTRIAXONE SODIUM 2 G
2 VIAL (EA) INJECTION EVERY 24 HOURS
Status: DISCONTINUED | OUTPATIENT
Start: 2018-02-17 | End: 2018-02-18 | Stop reason: HOSPADM

## 2018-02-16 RX ADMIN — IBUPROFEN 400 MG: 200 TABLET, FILM COATED ORAL at 12:47

## 2018-02-16 RX ADMIN — ACETAMINOPHEN 650 MG: 325 TABLET, FILM COATED ORAL at 06:27

## 2018-02-16 RX ADMIN — POTASSIUM CHLORIDE 20 MEQ: 750 TABLET, FILM COATED, EXTENDED RELEASE ORAL at 02:20

## 2018-02-16 RX ADMIN — OSELTAMIVIR PHOSPHATE 75 MG: 75 CAPSULE ORAL at 19:44

## 2018-02-16 RX ADMIN — SODIUM CHLORIDE: 9 INJECTION, SOLUTION INTRAVENOUS at 00:10

## 2018-02-16 RX ADMIN — ACETAMINOPHEN 650 MG: 325 TABLET, FILM COATED ORAL at 23:44

## 2018-02-16 RX ADMIN — OSELTAMIVIR PHOSPHATE 75 MG: 75 CAPSULE ORAL at 08:07

## 2018-02-16 RX ADMIN — POTASSIUM CHLORIDE 40 MEQ: 750 TABLET, FILM COATED, EXTENDED RELEASE ORAL at 00:03

## 2018-02-16 RX ADMIN — SODIUM CHLORIDE: 9 INJECTION, SOLUTION INTRAVENOUS at 08:06

## 2018-02-16 RX ADMIN — ONDANSETRON 4 MG: 4 TABLET, ORALLY DISINTEGRATING ORAL at 12:57

## 2018-02-16 RX ADMIN — ACETAMINOPHEN 650 MG: 325 TABLET, FILM COATED ORAL at 11:40

## 2018-02-16 RX ADMIN — SODIUM CHLORIDE: 9 INJECTION, SOLUTION INTRAVENOUS at 16:16

## 2018-02-16 RX ADMIN — CEFTRIAXONE SODIUM 2 G: 1 INJECTION, POWDER, FOR SOLUTION INTRAMUSCULAR; INTRAVENOUS at 08:08

## 2018-02-16 RX ADMIN — ACETAMINOPHEN 650 MG: 325 TABLET, FILM COATED ORAL at 19:53

## 2018-02-16 RX ADMIN — IBUPROFEN 400 MG: 200 TABLET, FILM COATED ORAL at 18:18

## 2018-02-16 RX ADMIN — ACETAMINOPHEN 650 MG: 325 TABLET, FILM COATED ORAL at 01:24

## 2018-02-16 RX ADMIN — KETOROLAC TROMETHAMINE 30 MG: 30 INJECTION, SOLUTION INTRAMUSCULAR at 04:03

## 2018-02-16 RX ADMIN — SODIUM CHLORIDE: 9 INJECTION, SOLUTION INTRAVENOUS at 23:45

## 2018-02-16 NOTE — PLAN OF CARE
Problem: Infection, Risk/Actual (Adult)  Goal: Identify Related Risk Factors and Signs and Symptoms  Related risk factors and signs and symptoms are identified upon initiation of Human Response Clinical Practice Guideline (CPG).   Pt. A&Ox4. VSS. Tmax this shift 101.3, last 99.6. Lung sounds CTA. Maintaining sats on RA. IS encouraged. Bowel sounds active, LBM 2/12. PP+ DP+. CMS and neuro's are intact. Denies numbness and tingling in all extremities. Denies nausea, shortness of breath, and chest pain. Back pain managed w/ prn Tylenol, Toradol, and warm packs. Voids spontaneously without difficulty in the BR. Tolerating regular diet. Pt up with SBA. PIV is patent and infusing. Bilateral heels are elevated off the bed. Call light is within reach, pt able to make needs known. Family at bedside. Will continue to monitor.  K+ 3.3 yesterday, on replacement protocol, replaced last night.  RSV in process.

## 2018-02-16 NOTE — PROVIDER NOTIFICATION
Text page sent to Mukund Hester PA-C @ 13:18:  Temp @ 13:00 102.1    Lactic acid protocol triggered.  Pt had Tylenol @ 11:40 and IB @ 12:47.  thank you  ric whittington RN [phone]  [facility] [id number]

## 2018-02-16 NOTE — PROGRESS NOTES
[facility], [place]    Internal Medicine Progress Note - [facility] Hospitalist Service    Assessment & Plan   Minnie Arambula is a 32 year old female admitted on 2/15/2018. She has a history of giant cell tumor of the L femur (2004) s/p resection and uterine fibroids and was admitted for sepsis 2/2 UTI and possible viral illness of unknown etiology.    Plan for today: Continue IVF, Ceftriaxone, Tamiflu. Follow culture susceptibilities. D/c ketorolac and start ibuprofen. Plan to d/c tomorrow.    Sepsis 2/2 Pyelonephritis, Viral Illness. Admitted w/ 4 day history of high fever, diffuse body aches, and dysuria. No ill contacts. Denies diarrhea, cough, sore throat, congestion, confusion. Urine culture w/ >100k E. Coli. CT A/P w/o hydronephrosis or stranding or ureterolithiasis - possible renal cyst noted. No leukocytosis. Tachycardia and fever have resolved. Endorses L flank pain. CRP downtrending. Improving on ceftriaxone, tamiflu and IVF.   - Continue Ceftriaxone 2g q24hrs for suspected UTI   - Continue Tamiflu BID x 5 days for suspected Influenza  - Pain: APAP and d/c ketorolac and start ibuprofen PRN  - Continue IVF, droplet isolation  - Follow urine & blood culture  - Trend CRP, Procal, CBC, BMP    Acute Constipation, resolved. No BM for 4 days PTA. Resolved w/ senna. Having normal BMs now, passing gas.    # Pain Assessment:   Current Pain Score 2/15/2018 7/27/2015 7/27/2015   Patient currently in pain? yes - -   Pain score (0-10) - - -   Pain location Generalized Abdomen Abdomen   Pain descriptors - Aching;Cramping -   - Minnie is experiencing pain due to muscular vs pyelo vs viral illness. Pain management was discussed with Minnie and her significant other and the plan was created in a collaborative fashion.  Minnie's response to the current recommendations: compliant  - Please see the plan for pain management as documented above      Diet: Combination Diet Regular Diet  Adult  Fluids: IVF  DVT Prophylaxis: Low Risk/Ambulatory with no VTE prophylaxis indicated and Pneumatic Compression Devices  Code Status: Full Code    Disposition Plan   Expected discharge: Tomorrow, recommended to prior living arrangement once antibiotic plan established and culture susceptibilites returned and blood culture neg >48hrs.     Entered: Mukund VELIZPau Hester 02/16/2018, 10:55 AM   Information in the above section will display in the discharge planner report.      The patient's care was discussed with the Attending Physician, Dr. Medina, Care Coordinator/, Patient and Patient's Family.    Mukund Hester  Internal Medicine Staff Hospitalist Service  [facility]  Pager: [phone]  Please see sticky note for cross cover information    Interval History   Patient feels much better. She states she still has pain in her L flank that radiates to the front. She denies recurrent fever/chills. She ate breakfast. She denies chest pain, dyspnea, vomiting.    Data reviewed today: I reviewed all medications, new labs and imaging results over the last 24 hours. I personally reviewed no images or EKG's today.    Physical Exam   Vital Signs: Temp: 99.4  F (37.4  C) Temp src: Oral BP: 104/68   Heart Rate: 97 Resp: 16 SpO2: 97 % O2 Device: None (Room air)    Weight: 0 lbs 0 oz  General Appearance: A&Ox3. Non-toxic appearing, NAD.  Respiratory: Normal effort on RA. Lungs CTAB without wheezing, rales or rhonchi.  Cardiovascular: RRR. S1, S2. No murmurs.  GI: Abdomen soft, tender in the L CVA and the left abdomen. Bowel sounds are present.  Skin: No acute rashes on exposed areas of skin  Other: No peripheral edema or calf tenderness.      Data   Data     Recent Labs  Lab 02/16/18  0549 02/15/18  0648   WBC 5.6 8.0   HGB 9.2* 11.5*   MCV 82 82   * 166    138   POTASSIUM 3.7 3.3*   CHLORIDE 116* 106   CO2 22 24   BUN 7 8   CR 0.45* 0.56   ANIONGAP 5 8   IDALIA 7.2* 8.2*   GLC 90 115*    ALBUMIN  --  3.2*   PROTTOTAL  --  7.3   BILITOTAL  --  0.4   ALKPHOS  --  62   ALT  --  26   AST  --  15     No results found for this or any previous visit (from the past 24 hour(s)).

## 2018-02-16 NOTE — PLAN OF CARE
Problem: Infection, Risk/Actual (Adult)  Goal: Identify Related Risk Factors and Signs and Symptoms  Related risk factors and signs and symptoms are identified upon initiation of Human Response Clinical Practice Guideline (CPG).   Outcome: Improving  2/16  07:30-15:30  VS: T 99.4, oral this morning@ 10:10  Pt reporting chills/shivering @ 11:40, temp 100.1, orally.  Tylenol given @ 11:40  Temp @ 13:00 was 102.1, . Ibuprofen @ 12:47   HR 97   O2 Oxygen sats 97 on room air  Lungs CTA, denies chest pain/SOB   Output: Voiding spontaneously without difficulty   Bowels/BM Pt reported having a BM this morning  Voiding spontaneously without difficulty, denies any UTI symptoms   Activity Up ad cory   Skin: intact   Pain: Continues to have back pain, warm packs help.  IV toradol and prn tylenol   CMS: intact   Dressing: none   Diet: Regular, tolerating well   LDA: PIV    Equipment: IV pump and pole   Plan: Alternate tylenol q 4 h  and Ibuprofen q 6 h   Additional Info: Temp @ 13:00 102. 1   Lactic acid protocol triggered.    Pt had tylenol @ 11:40 and ibuprofen @ 12:47  Pt also reported nausea just after Ibuprofen was given, stated it started before the ibuprofen.  Zofran was given.  [facility] re:temp & lactic acid protocol triggered.

## 2018-02-17 LAB
BACTERIA SPEC CULT: ABNORMAL
CRP SERPL-MCNC: 110 MG/L (ref 0–8)
ERYTHROCYTE [DISTWIDTH] IN BLOOD BY AUTOMATED COUNT: 14.1 % (ref 10–15)
HCT VFR BLD AUTO: 27.1 % (ref 35–47)
HGB BLD-MCNC: 8.9 G/DL (ref 11.7–15.7)
Lab: ABNORMAL
MCH RBC QN AUTO: 27.1 PG (ref 26.5–33)
MCHC RBC AUTO-ENTMCNC: 32.8 G/DL (ref 31.5–36.5)
MCV RBC AUTO: 82 FL (ref 78–100)
PLATELET # BLD AUTO: 139 10E9/L (ref 150–450)
PROCALCITONIN SERPL-MCNC: 0.22 NG/ML
RBC # BLD AUTO: 3.29 10E12/L (ref 3.8–5.2)
SPECIMEN SOURCE: ABNORMAL
WBC # BLD AUTO: 4.5 10E9/L (ref 4–11)

## 2018-02-17 PROCEDURE — 25000128 H RX IP 250 OP 636: Performed by: EMERGENCY MEDICINE

## 2018-02-17 PROCEDURE — 85027 COMPLETE CBC AUTOMATED: CPT | Performed by: PHYSICIAN ASSISTANT

## 2018-02-17 PROCEDURE — 99232 SBSQ HOSP IP/OBS MODERATE 35: CPT | Performed by: INTERNAL MEDICINE

## 2018-02-17 PROCEDURE — 84145 PROCALCITONIN (PCT): CPT | Performed by: PHYSICIAN ASSISTANT

## 2018-02-17 PROCEDURE — 25000132 ZZH RX MED GY IP 250 OP 250 PS 637: Performed by: PHYSICIAN ASSISTANT

## 2018-02-17 PROCEDURE — 12000001 ZZH R&B MED SURG/OB UMMC

## 2018-02-17 PROCEDURE — 36415 COLL VENOUS BLD VENIPUNCTURE: CPT | Performed by: PHYSICIAN ASSISTANT

## 2018-02-17 PROCEDURE — 86140 C-REACTIVE PROTEIN: CPT | Performed by: PHYSICIAN ASSISTANT

## 2018-02-17 PROCEDURE — 27210995 ZZH RX 272: Performed by: INTERNAL MEDICINE

## 2018-02-17 PROCEDURE — 25000128 H RX IP 250 OP 636: Performed by: INTERNAL MEDICINE

## 2018-02-17 RX ADMIN — IBUPROFEN 400 MG: 200 TABLET, FILM COATED ORAL at 16:22

## 2018-02-17 RX ADMIN — OSELTAMIVIR PHOSPHATE 75 MG: 75 CAPSULE ORAL at 08:02

## 2018-02-17 RX ADMIN — IBUPROFEN 400 MG: 200 TABLET, FILM COATED ORAL at 08:02

## 2018-02-17 RX ADMIN — ACETAMINOPHEN 650 MG: 325 TABLET, FILM COATED ORAL at 16:22

## 2018-02-17 RX ADMIN — OSELTAMIVIR PHOSPHATE 75 MG: 75 CAPSULE ORAL at 19:32

## 2018-02-17 RX ADMIN — CEFTRIAXONE SODIUM 2 G: 2 INJECTION, POWDER, FOR SOLUTION INTRAMUSCULAR; INTRAVENOUS at 08:03

## 2018-02-17 RX ADMIN — SODIUM CHLORIDE: 9 INJECTION, SOLUTION INTRAVENOUS at 16:23

## 2018-02-17 RX ADMIN — ACETAMINOPHEN 650 MG: 325 TABLET, FILM COATED ORAL at 06:23

## 2018-02-17 RX ADMIN — ACETAMINOPHEN 650 MG: 325 TABLET, FILM COATED ORAL at 21:47

## 2018-02-17 RX ADMIN — SODIUM CHLORIDE: 9 INJECTION, SOLUTION INTRAVENOUS at 08:04

## 2018-02-17 RX ADMIN — SENNOSIDES AND DOCUSATE SODIUM 1 TABLET: 8.6; 5 TABLET ORAL at 19:32

## 2018-02-17 ASSESSMENT — ACTIVITIES OF DAILY LIVING (ADL)
RETIRED_EATING: 0-->INDEPENDENT
AMBULATION: 0-->INDEPENDENT
TRANSFERRING: 0-->INDEPENDENT
DRESS: 0-->INDEPENDENT
FALL_HISTORY_WITHIN_LAST_SIX_MONTHS: NO
RETIRED_COMMUNICATION: 0-->UNDERSTANDS/COMMUNICATES WITHOUT DIFFICULTY
BATHING: 0-->INDEPENDENT
SWALLOWING: 0-->SWALLOWS FOODS/LIQUIDS WITHOUT DIFFICULTY
COGNITION: 0 - NO COGNITION ISSUES REPORTED
TOILETING: 0-->INDEPENDENT

## 2018-02-17 NOTE — PROGRESS NOTES
Pt feels much better this am  L flank pain improved  No lower abd pain or dysuria  Appetite improved, no nausea    T   BP 100s/  HR 80s    Alert, fully oriented, appears much more comfortable  Lungs clear  CV rrr no M  Abd soft, non-distended  No flank or CVA tenderness  No LE edema    Results for VONDA GARCIA (MRN [id number]) as of 2/17/2018 09:42   Ref. Range 2/17/2018 06:39   CRP Inflammation Latest Ref Range: 0.0 - 8.0 mg/L 110.0 (H)   Procalcitonin Latest Units: ng/ml 0.22   WBC Latest Ref Range: 4.0 - 11.0 10e9/L 4.5   Hemoglobin Latest Ref Range: 11.7 - 15.7 g/dL 8.9 (L)   Hematocrit Latest Ref Range: 35.0 - 47.0 % 27.1 (L)   Platelet Count Latest Ref Range: 150 - 450 10e9/L 139 (L)   RBC Count Latest Ref Range: 3.8 - 5.2 10e12/L 3.29 (L)   MCV Latest Ref Range: 78 - 100 fl 82   MCH Latest Ref Range: 26.5 - 33.0 pg 27.1   MCHC Latest Ref Range: 31.5 - 36.5 g/dL 32.8   RDW Latest Ref Range: 10.0 - 15.0 % 14.1       Assessment    UTI with sepsis picture, clinically improved, on Rocephin 2 grams IV  Influenza less likely    Anemia, likely secondary to acute infection, not symptomatic    Plan  Continue Rocephin thru tomorrow am  Anticipate start of cipro tomorrow  Continue to mobilize  Anticipate d/c to home tomorrow

## 2018-02-17 NOTE — PROGRESS NOTES
Temp went up to 102- tylenol and Motrin given ,cool towel applied to forehead, encouraged patient to use less blankets, will continue to monitor patient.

## 2018-02-17 NOTE — PLAN OF CARE
Problem: Infection, Risk/Actual (Adult)  Goal: Identify Related Risk Factors and Signs and Symptoms  Related risk factors and signs and symptoms are identified upon initiation of Human Response Clinical Practice Guideline (CPG).   Outcome: Improving  2/17  07:30-15:30  VS: Stable, max temp this shift 99.5 this morning, re-check of temp at noon was 98   O2 99% on room air  Lungs CTA/denies chest pain/SOB   Output: Voiding spontaneously without difficulty  Pt reporting some blood in urine, but when she voided in container, no visible blood in urine.  One drop of red on edge of container.  Pt told to continue voiding in container for nursing to monitor   Bowels/BM Large soft formed BM this morning   Activity Up to BR with assist to move IV pump/pole   Skin: intact   Pain: Back pain improved, declined pain meds this shift   CMS: intact   Dressing: none   Diet: Regular  Tolerating well   LDA: PIV, fluids infusing @ 125/hr  PIV site WDL   Equipment: IV pump and pole   Plan: Home 2/18??   Additional Info:

## 2018-02-17 NOTE — PLAN OF CARE
Problem: Infection, Risk/Actual (Adult)  Goal: Identify Related Risk Factors and Signs and Symptoms  Related risk factors and signs and symptoms are identified upon initiation of Human Response Clinical Practice Guideline (CPG).   Outcome: Improving  Patient A&O x4, lungs sound clear, Bowel sound active- report having diarrhea- will collect sample with another loose stool- patient  Informed to let staff know about another diarrhea episode, Denied CP, lightheadedness, dizziness, numbness, tingling and SOB, iv fluid infusing, drinking well and voiding spontaneously without difficulties, pain tolerable and taking Motrin and tylenol for lower back pain, current temp 99.7 , up to bathroom with SBA assist, demonstrates the ability to use call light appropriately, will continue to monitor patient.

## 2018-02-17 NOTE — PLAN OF CARE
Problem: Infection, Risk/Actual (Adult)  Goal: Identify Related Risk Factors and Signs and Symptoms  Related risk factors and signs and symptoms are identified upon initiation of Human Response Clinical Practice Guideline (CPG).   Outcome: No Change  Pt A/O X 4. Pt had elevated temp on evenings yesterday, treading downward, now 99.7, prn tylenol x2 given. VSS. Lungs-bilaterally. IS encouraged. PP+ DP+. CMS and Neuro's are intact. Denies numbness and tingling in all extremities. Denies nausea, SOB, and CP. Denies pain. Voids without difficulty in the BR. LBM yesterday. Pt up independently. PIV patent in L hand and infusing SC at 125ml/hr. Pt is able to make needs known and the call light is within the pt's reach. Continue to monitor.

## 2018-02-18 VITALS
DIASTOLIC BLOOD PRESSURE: 71 MMHG | HEART RATE: 84 BPM | SYSTOLIC BLOOD PRESSURE: 108 MMHG | RESPIRATION RATE: 14 BRPM | TEMPERATURE: 98.7 F | OXYGEN SATURATION: 95 %

## 2018-02-18 PROCEDURE — 99207 ZZC NON-BILLABLE SERV PER CHARTING: CPT | Performed by: INTERNAL MEDICINE

## 2018-02-18 PROCEDURE — 27210995 ZZH RX 272: Performed by: INTERNAL MEDICINE

## 2018-02-18 PROCEDURE — 25000128 H RX IP 250 OP 636: Performed by: EMERGENCY MEDICINE

## 2018-02-18 PROCEDURE — 25000132 ZZH RX MED GY IP 250 OP 250 PS 637: Performed by: PHYSICIAN ASSISTANT

## 2018-02-18 PROCEDURE — 25000128 H RX IP 250 OP 636: Performed by: INTERNAL MEDICINE

## 2018-02-18 RX ORDER — CIPROFLOXACIN 500 MG/1
500 TABLET, FILM COATED ORAL 2 TIMES DAILY
Qty: 14 TABLET | Refills: 0 | Status: SHIPPED | OUTPATIENT
Start: 2018-02-18 | End: 2019-03-15

## 2018-02-18 RX ADMIN — CEFTRIAXONE SODIUM 2 G: 2 INJECTION, POWDER, FOR SOLUTION INTRAMUSCULAR; INTRAVENOUS at 08:01

## 2018-02-18 RX ADMIN — OSELTAMIVIR PHOSPHATE 75 MG: 75 CAPSULE ORAL at 08:01

## 2018-02-18 RX ADMIN — SODIUM CHLORIDE: 9 INJECTION, SOLUTION INTRAVENOUS at 08:01

## 2018-02-18 NOTE — PLAN OF CARE
Problem: Infection, Risk/Actual (Adult)  Goal: Identify Related Risk Factors and Signs and Symptoms  Related risk factors and signs and symptoms are identified upon initiation of Human Response Clinical Practice Guideline (CPG).   Outcome: Improving  2/18  07:30-  VS: Stable  Afebrile last 24 hours   O2  sats 95% on room air   Output: Voiding spontaneously without difficulty.  No blood visible in urine this am   Bowels/BM Pt reported BM this morning   Activity Up ad cory   Skin: intact   Pain: denies   CMS: intact   Dressing: none   diet regular   LDA: PIV, discontinued   Equipment: none   Plan: Discharge home today   Additional Info: Pt to follow up with primary care provider for flu shot when   Discharge instructions and meds given and reviewed with pt.  Pt verbalized understanding.  Pt discharged with meds, discharge instructions and all personal belongings @ 12:00.

## 2018-02-18 NOTE — PLAN OF CARE
Problem: Infection, Risk/Actual (Adult)  Goal: Infection Prevention/Resolution  Patient will demonstrate the desired outcomes by discharge/transition of care.   Outcome: Improving  VSS. A/Ox's 4. Pain rated as tolerable. Tylenol and Ibuprofen for pain control of headache, recheck in 1 hour with decreased pain. CMS intact. Tolerated regular diet, forgot to order dinner and had box lunch. Denied any nausea, CP, SOB, lightheadedness or dizziness. Voiding without pain or difficulty. BM today. Passing flatus. Resting in bed at this time with call light in reach and able to make needs known.

## 2018-02-18 NOTE — DISCHARGE SUMMARY
Admit Date:     02/15/2018   Discharge Date:           BRIEF HISTORY:  Minnie Arambula is a very pleasant 32-year-old female without a history of urologic disease who was hospitalized for the evaluation of high fever, myalgias and dysuria.  Urinalysis revealed moderate leukocyte esterase, positive nitrites, as well as bacteria.  CT scan of the abdomen and pelvis revealed no signs of pyelonephritis or other structural abnormality.  Differential at the time of admission was a viral illness including influenza versus UTI with sepsis.      HOSPITAL COURSE:  The patient was started empirically on Tamiflu in the emergency room.  She was also given Rocephin IV pending the results of the cultures.  Urine cultures ultimately were positive for E. coli, pansensitive.  Influenza screen as well as respiratory viral screens were negative.      The patient's status gradually improved during her hospitalization.  She became afebrile on the third day of hospitalization, vital signs were stable throughout.  The patient began to feel considerably better on the third day of hospitalization.  At the time of discharge she was alert, fully oriented, eating well, ambulating about the unit without difficulty and had noted near-complete resolution of her abdominal and flank pain.        Laboratory studies of note included a CRP on admission of 159 and diminished to 110 on the day prior to discharge.  White blood cell count was normal throughout.  Hemoglobin on the day prior to discharge was 8.9, hemoglobin on admission was 11.5.  It is not clear what the patient's baseline hemoglobin is, but certainly she may have an acute anemia related to acute infection, perhaps superimposed upon a chronic anemia.      As above, the patient had an unremarkable CT of the abdomen and pelvis prior to admission suggesting that this was uncomplicated UTI.  There is no previous history of UTIs.  The patient was started on Cipro on the morning of  discharge, which she will take for a 7-day course.  The patient did tolerate antibiotics without incident.  She had no GI upset or diarrhea with antibiotics.      DISCHARGE DIAGNOSES:   1.  Urinary tract infection with a sepsis picture including high fever, markedly elevated CRP, resolving with antibiotic therapy.  CT of the abdomen and pelvis was unremarkable as noted above.   2.  Anemia, likely secondary to acute infection.      DISCHARGE MEDICATIONS:  Cipro 500 mg twice daily for a 7-day course, which will complete a 10-day total course of antibiotics.        DISCHARGE INSTRUCTIONS:  She was instructed to follow up with her primary physician in 1-2 weeks to have her hemoglobin repeated.  She was advised to return to the ER or contact her provider should she experience recurrence of abdominal pain, dysuria, high fevers or severe diarrhea.         BETZY RICHARDS MD             D: 2018   T: 2018   MT: [id number]      Name:     VONDA GARCIA   MRN:      [id number]        Account:        [id number]   :      1985           Admit Date:     02/15/2018                                  Discharge Date:       Document: [id number]

## 2018-02-18 NOTE — PLAN OF CARE
Problem: Infection, Risk/Actual (Adult)  Goal: Identify Related Risk Factors and Signs and Symptoms  Related risk factors and signs and symptoms are identified upon initiation of Human Response Clinical Practice Guideline (CPG).   Outcome: No Change  Pt A/O X 4. Afebrile. VSS. Lungs clear bilaterally. IS encouraged. PP+ DP+. CMS and Neuro's are intact. Denies numbness and tingling in all extremities. Denies nausea, SOB, and CP. Pt denies headache. Voids without difficulty in the BR. LBM 2/18/2018. Pt up independently. PIV patent in L arm and infusing SC at 125ml/hr. Bilateral heels are elevated off the bed. Pt is able to make needs known and the call light is within the pt's reach. Continue to monitor.

## 2018-02-21 LAB
BACTERIA SPEC CULT: NO GROWTH
Lab: NORMAL
SPECIMEN SOURCE: NORMAL

## 2019-03-15 ENCOUNTER — APPOINTMENT (OUTPATIENT)
Dept: GENERAL RADIOLOGY | Facility: CLINIC | Age: 34
End: 2019-03-15
Attending: EMERGENCY MEDICINE
Payer: COMMERCIAL

## 2019-03-15 ENCOUNTER — HOSPITAL ENCOUNTER (EMERGENCY)
Facility: CLINIC | Age: 34
Discharge: HOME | End: 2019-03-15
Attending: EMERGENCY MEDICINE | Admitting: EMERGENCY MEDICINE
Payer: COMMERCIAL

## 2019-03-15 VITALS
DIASTOLIC BLOOD PRESSURE: 66 MMHG | WEIGHT: 145 LBS | HEIGHT: 61 IN | OXYGEN SATURATION: 96 % | RESPIRATION RATE: 18 BRPM | TEMPERATURE: 99 F | SYSTOLIC BLOOD PRESSURE: 102 MMHG | BODY MASS INDEX: 27.38 KG/M2

## 2019-03-15 DIAGNOSIS — J10.1 INFLUENZA A: ICD-10-CM

## 2019-03-15 LAB
ALBUMIN SERPL-MCNC: 3.4 G/DL (ref 3.4–5)
ALP SERPL-CCNC: 81 U/L (ref 40–150)
ALT SERPL W P-5'-P-CCNC: 47 U/L (ref 0–50)
ANION GAP SERPL CALCULATED.3IONS-SCNC: 10 MMOL/L (ref 3–14)
AST SERPL W P-5'-P-CCNC: 30 U/L (ref 0–45)
BASOPHILS # BLD AUTO: 0 10E9/L (ref 0–0.2)
BASOPHILS NFR BLD AUTO: 0.6 %
BILIRUB SERPL-MCNC: 0.2 MG/DL (ref 0.2–1.3)
BUN SERPL-MCNC: 6 MG/DL (ref 7–30)
CALCIUM SERPL-MCNC: 8.2 MG/DL (ref 8.5–10.1)
CHLORIDE SERPL-SCNC: 107 MMOL/L (ref 94–109)
CO2 SERPL-SCNC: 24 MMOL/L (ref 20–32)
CREAT SERPL-MCNC: 0.63 MG/DL (ref 0.52–1.04)
DIFFERENTIAL METHOD BLD: ABNORMAL
EOSINOPHIL # BLD AUTO: 0 10E9/L (ref 0–0.7)
EOSINOPHIL NFR BLD AUTO: 0.3 %
ERYTHROCYTE [DISTWIDTH] IN BLOOD BY AUTOMATED COUNT: 13.4 % (ref 10–15)
FLUAV+FLUBV AG SPEC QL: NEGATIVE
FLUAV+FLUBV AG SPEC QL: POSITIVE
GFR SERPL CREATININE-BSD FRML MDRD: >90 ML/MIN/{1.73_M2}
GLUCOSE SERPL-MCNC: 101 MG/DL (ref 70–99)
HCT VFR BLD AUTO: 38 % (ref 35–47)
HGB BLD-MCNC: 12.6 G/DL (ref 11.7–15.7)
IMM GRANULOCYTES # BLD: 0 10E9/L (ref 0–0.4)
IMM GRANULOCYTES NFR BLD: 0.3 %
LACTATE BLD-SCNC: 1.7 MMOL/L (ref 0.7–2)
LYMPHOCYTES # BLD AUTO: 0.4 10E9/L (ref 0.8–5.3)
LYMPHOCYTES NFR BLD AUTO: 11.5 %
MCH RBC QN AUTO: 27.6 PG (ref 26.5–33)
MCHC RBC AUTO-ENTMCNC: 33.2 G/DL (ref 31.5–36.5)
MCV RBC AUTO: 83 FL (ref 78–100)
MONOCYTES # BLD AUTO: 0.3 10E9/L (ref 0–1.3)
MONOCYTES NFR BLD AUTO: 9.2 %
NEUTROPHILS # BLD AUTO: 2.7 10E9/L (ref 1.6–8.3)
NEUTROPHILS NFR BLD AUTO: 78.1 %
NRBC # BLD AUTO: 0 10*3/UL
NRBC BLD AUTO-RTO: 0 /100
PLATELET # BLD AUTO: 144 10E9/L (ref 150–450)
POTASSIUM SERPL-SCNC: 3 MMOL/L (ref 3.4–5.3)
PROT SERPL-MCNC: 7.5 G/DL (ref 6.8–8.8)
RBC # BLD AUTO: 4.57 10E12/L (ref 3.8–5.2)
SODIUM SERPL-SCNC: 141 MMOL/L (ref 133–144)
SPECIMEN SOURCE: ABNORMAL
WBC # BLD AUTO: 3.5 10E9/L (ref 4–11)

## 2019-03-15 PROCEDURE — 85025 COMPLETE CBC W/AUTO DIFF WBC: CPT | Performed by: EMERGENCY MEDICINE

## 2019-03-15 PROCEDURE — 80053 COMPREHEN METABOLIC PANEL: CPT | Performed by: EMERGENCY MEDICINE

## 2019-03-15 PROCEDURE — 25000132 ZZH RX MED GY IP 250 OP 250 PS 637: Performed by: EMERGENCY MEDICINE

## 2019-03-15 PROCEDURE — 87040 BLOOD CULTURE FOR BACTERIA: CPT | Performed by: EMERGENCY MEDICINE

## 2019-03-15 PROCEDURE — 99284 EMERGENCY DEPT VISIT MOD MDM: CPT | Mod: 25

## 2019-03-15 PROCEDURE — 25800030 ZZH RX IP 258 OP 636: Performed by: EMERGENCY MEDICINE

## 2019-03-15 PROCEDURE — 25000128 H RX IP 250 OP 636: Performed by: EMERGENCY MEDICINE

## 2019-03-15 PROCEDURE — 87804 INFLUENZA ASSAY W/OPTIC: CPT | Performed by: EMERGENCY MEDICINE

## 2019-03-15 PROCEDURE — 96374 THER/PROPH/DIAG INJ IV PUSH: CPT

## 2019-03-15 PROCEDURE — 99284 EMERGENCY DEPT VISIT MOD MDM: CPT | Mod: Z6 | Performed by: EMERGENCY MEDICINE

## 2019-03-15 PROCEDURE — 96375 TX/PRO/DX INJ NEW DRUG ADDON: CPT

## 2019-03-15 PROCEDURE — 83605 ASSAY OF LACTIC ACID: CPT | Performed by: EMERGENCY MEDICINE

## 2019-03-15 PROCEDURE — 71046 X-RAY EXAM CHEST 2 VIEWS: CPT

## 2019-03-15 PROCEDURE — 96361 HYDRATE IV INFUSION ADD-ON: CPT

## 2019-03-15 RX ORDER — ONDANSETRON 2 MG/ML
4 INJECTION INTRAMUSCULAR; INTRAVENOUS ONCE
Status: COMPLETED | OUTPATIENT
Start: 2019-03-15 | End: 2019-03-15

## 2019-03-15 RX ORDER — KETOROLAC TROMETHAMINE 15 MG/ML
15 INJECTION, SOLUTION INTRAMUSCULAR; INTRAVENOUS ONCE
Status: COMPLETED | OUTPATIENT
Start: 2019-03-15 | End: 2019-03-15

## 2019-03-15 RX ORDER — ONDANSETRON 4 MG/1
4 TABLET, ORALLY DISINTEGRATING ORAL EVERY 8 HOURS PRN
Qty: 10 TABLET | Refills: 0 | Status: SHIPPED | OUTPATIENT
Start: 2019-03-15 | End: 2019-03-18

## 2019-03-15 RX ORDER — POTASSIUM CHLORIDE 1.5 G/1.58G
40 POWDER, FOR SOLUTION ORAL ONCE
Status: COMPLETED | OUTPATIENT
Start: 2019-03-15 | End: 2019-03-15

## 2019-03-15 RX ORDER — ACETAMINOPHEN 325 MG/1
650 TABLET ORAL ONCE
Status: COMPLETED | OUTPATIENT
Start: 2019-03-15 | End: 2019-03-15

## 2019-03-15 RX ORDER — POTASSIUM CL/LIDO/0.9 % NACL 10MEQ/0.1L
10 INTRAVENOUS SOLUTION, PIGGYBACK (ML) INTRAVENOUS ONCE
Status: COMPLETED | OUTPATIENT
Start: 2019-03-15 | End: 2019-03-15

## 2019-03-15 RX ADMIN — SODIUM CHLORIDE, POTASSIUM CHLORIDE, SODIUM LACTATE AND CALCIUM CHLORIDE 1000 ML: 600; 310; 30; 20 INJECTION, SOLUTION INTRAVENOUS at 18:46

## 2019-03-15 RX ADMIN — POTASSIUM CHLORIDE 40 MEQ: 1.5 POWDER, FOR SOLUTION ORAL at 19:51

## 2019-03-15 RX ADMIN — ACETAMINOPHEN 650 MG: 325 TABLET, FILM COATED ORAL at 20:41

## 2019-03-15 RX ADMIN — Medication 10 MEQ: at 19:47

## 2019-03-15 RX ADMIN — KETOROLAC TROMETHAMINE 15 MG: 15 INJECTION, SOLUTION INTRAMUSCULAR; INTRAVENOUS at 18:42

## 2019-03-15 RX ADMIN — ONDANSETRON 4 MG: 2 INJECTION INTRAMUSCULAR; INTRAVENOUS at 18:42

## 2019-03-15 ASSESSMENT — ENCOUNTER SYMPTOMS
NAUSEA: 1
COUGH: 1
WEAKNESS: 0
NERVOUS/ANXIOUS: 0
FEVER: 1
ABDOMINAL PAIN: 0
DYSURIA: 0
SHORTNESS OF BREATH: 0
MYALGIAS: 1
VOMITING: 1
HEADACHES: 0

## 2019-03-15 ASSESSMENT — MIFFLIN-ST. JEOR: SCORE: 1300.1

## 2019-03-15 NOTE — ED PROVIDER NOTES
"  History     Chief Complaint   Patient presents with     Vomiting     Vomiting, aching, and fevers since yesterday.      HPI  Minnie Arambula is a 33 year old female who presents with a febrile illness.  She has no past medical history.  She is coming in with 1 day of subjective fever, nausea and vomiting cough, and body aches.  She is been having trouble keeping liquids down due to nausea and vomiting.  She has been taking a cough syrup with acetaminophen for her symptoms with minimal relief.  She has been previously hospitalized for a febrile associated with UTI however she has no urinary symptoms today.  She denies pain aside from mild epigastric pain/lower rib pain with coughing.  She has no recent travel.  She has no rash.  Her son was recently ill with febrile illness but has recovered with home treatment with ibuprofen and Tylenol.  She did get her flu shot this year.    I have reviewed the Medications, Allergies, Past Medical and Surgical History, and Social History in the Epic system.    Review of Systems   Constitutional: Positive for fever.   HENT: Negative for congestion.    Respiratory: Positive for cough. Negative for shortness of breath.    Cardiovascular: Negative for chest pain.   Gastrointestinal: Positive for nausea and vomiting. Negative for abdominal pain.   Genitourinary: Negative for dysuria.   Musculoskeletal: Positive for myalgias.   Skin: Negative for rash.   Neurological: Negative for weakness (generalized) and headaches.   Psychiatric/Behavioral: The patient is not nervous/anxious.        Physical Exam   BP: 119/67  Heart Rate: 119  Temp: 100.8  F (38.2  C)  Height: 154.9 cm (5' 1\")  Weight: 65.8 kg (145 lb)  SpO2: 93 %      Physical Exam   Constitutional: She is oriented to person, place, and time. She appears well-developed and well-nourished. No distress.   HENT:   Head: Normocephalic and atraumatic.   Mouth/Throat: Oropharynx is clear and moist.   Eyes: Conjunctivae are " normal. Pupils are equal, round, and reactive to light.   Neck: Normal range of motion.   Cardiovascular: Normal rate, regular rhythm and normal heart sounds.   No murmur heard.  Pulmonary/Chest: Effort normal and breath sounds normal. No stridor. No respiratory distress. She has no wheezes.   Abdominal: Soft. She exhibits no distension. There is no tenderness.   Musculoskeletal: Normal range of motion. She exhibits no edema.   Neurological: She is alert and oriented to person, place, and time. Sensory deficit:      Skin: Skin is warm and dry.       ED Course        Procedures             Critical Care time:  none             Labs Ordered and Resulted from Time of ED Arrival Up to the Time of Departure from the ED - No data to display         Assessments & Plan (with Medical Decision Making)   Ms. Najera is a 33-year-old woman with a past medical history of febrile UTI requiring admission who presents today with 1 day of fever, nausea and vomiting, cough, and body aches.  Her exam does not show any obvious source of her infection.  She does appear ill.  She technically meets sepsis criteria however I do not want to give her antibiotics at this time because I do not know if this is a viral or bacterial source.  Plan to obtain blood work, chest x-ray, flu swab.  Will give fluids, Toradol, Zofran and reassess.  Will treat with antibiotics as appropriate.  Admission versus discharge depending on response to treatment and results of workup.    Mihaela Angulo MD on 3/15/2019 at 6:33 PM     Progress note:  Patient feels somewhat improved after the Toradol and fluids however still uncomfortable.  Will give acetaminophen.  Her flu swab was positive for influenza A.  No other concerns for serious bacterial infection on her workup (chest x-ray clear, WBC normal).  I do not think she warrants admission at this time.  Recommended follow-up on her blood cultures within the next 2 days.  Will give her Tylenol now and finish  fluids and she will be discharged.  She has no comorbidities and so I will not recommend Tamiflu.    Mihaela Angulo MD on 3/15/2019 at 9:16 PM      I have reviewed the nursing notes.    I have reviewed the findings, diagnosis, plan and need for follow up with the patient.       Medication List      There are no discharge medications for this visit.         Final diagnoses:   None       3/15/2019   [facility], [place], EMERGENCY DEPARTMENT     Mihaela Angulo MD  03/15/19 3287

## 2019-03-15 NOTE — ED AVS SNAPSHOT
[facility], [place], Emergency Department  [address]  [facility] [address]  Phone:  [phone]  Fax:  [phone]                                    Minnie Arambula   MRN: [id number]    Department:  [facility], Emergency Department   Date of Visit:  3/15/2019           After Visit Summary Signature Page    I have received my discharge instructions, and my questions have been answered. I have discussed any challenges I see with this plan with the nurse or doctor.    ..........................................................................................................................................  Patient/Patient Representative Signature      ..........................................................................................................................................  Patient Representative Print Name and Relationship to Patient    ..................................................               ................................................  Date                                   Time    ..........................................................................................................................................  Reviewed by Signature/Title    ...................................................              ..............................................  Date                                               Time          22EPIC Rev 08/18

## 2019-03-16 NOTE — DISCHARGE INSTRUCTIONS
Tylenol 650 mg every 6 hours as needed for fever.  Do not take more than 3250 mg per day  Ibuprofen 600 mg every 6 hours as needed for fever with food and plenty of water  You may alternate these medications every 6 hours for fever control  Zofran every 8 hours as needed for nausea/vomiting.  Remain well-hydrated with 1-2 L of water daily  Consider including a 12 ounce sports drink once daily while ill  [place] diet as tolerated  See your primary doctor in 3-5 days  Return to the emergency department for fever greater than 101 that does not respond to medication, nausea and vomiting such that you can stay hydrated, lightheadedness/dizziness, worsening pain, headache or neck stiffness, rash, or if you have any new or changing symptoms or concerns

## 2019-03-21 LAB
BACTERIA SPEC CULT: NO GROWTH
BACTERIA SPEC CULT: NO GROWTH
Lab: NORMAL
Lab: NORMAL
SPECIMEN SOURCE: NORMAL
SPECIMEN SOURCE: NORMAL

## 2020-05-08 ENCOUNTER — VIRTUAL VISIT (OUTPATIENT)
Dept: FAMILY MEDICINE | Facility: OTHER | Age: 35
End: 2020-05-08

## 2020-05-08 NOTE — PROGRESS NOTES
"Date: 2020 12:37:26  Clinician: Veronica Blank  Clinician NPI: [id number]  Patient: Minnie Najera  Patient : 1985  Patient Address: [facility] [place] [address]  Patient Phone: [phone]  Visit Protocol: URI  Patient Summary:  Minnie is a 34 year old ( : 1985 ) female who initiated a Visit for COVID-19 (Coronavirus) evaluation and screening. When asked the question \"Please sign me up to receive news, health information and promotions from [facility].\", Minnie responded \"Yes\".    Minnie states her symptoms started gradually 5-6 days ago. After her symptoms started, they improved and then got worse again.   Her symptoms consist of rhinitis, nasal congestion, ageusia, anosmia, and malaise.   Symptom details   Nasal secretions: The color of her mucus is clear.   Minnie denies having fever, myalgias, facial pain or pressure, sore throat, cough, vomiting, nausea, teeth pain, diarrhea, ear pain, headache, wheezing, enlarged lymph nodes, and chills. She also denies taking antibiotic medication for the symptoms and having recent facial or sinus surgery in the past 60 days.    Pertinent COVID-19 (Coronavirus) information  Minnie does not work or volunteer as healthcare worker or a  and does not work or volunteer in a healthcare facility.   She does not live with a healthcare worker.   Minnie has had a close contact with a laboratory-confirmed COVID-19 patient within 14 days of symptom onset. She also has had a close contact with a suspected COVID-19 patient within 14 days of symptom onset. Additional information about contact with COVID-19 (Coronavirus) patient as reported by the patient (free text): I had direct contact with a person that was officially diagnosed with Covid-19   Triage Point(s) temporarily suspended for COVID-19 (Coronavirus) screening  Minnie reported the following symptoms which were previously protocol referral points. These protocol referral points have temporarily been " removed for purposes of COVID-19 (Coronavirus) screening.   Difficulty breathing even when resting and can only speak in phrase(s)   Pertinent medical history  Minnei does not get yeast infections when she takes antibiotics.   Minnie needs a return to work/school note.   Weight: 144 lbs   Minnie does not smoke or use smokeless tobacco.   She denies pregnancy and denies breastfeeding. She has menstruated in the past month.   Weight: 144 lbs  A synchronous phone visit was initiated by the provider for the following reason: evaluate SOB    MEDICATIONS: No current medications, ALLERGIES: NKDA  Clinician Response:  Dear Minnie,   Dear Minnie  Your symptoms show that you may have coronavirus (COVID-19). This illness can cause fever, cough and trouble breathing. Many people get a mild case and get better on their own. Some people can get very sick.   Will I be tested for COVID-19?  Because we have limited testing supplies, we do not test everyone who is at low risk. We are testing if:    You are very ill. For example, you're on chemotherapy, dialysis or home hospice care. (Contact your specialty clinic or program.)   You live in a nursing home or other long-term care facility. (Talk to your nurse manager or medical director.)   You're a health care worker. ([facility] employees contact our employee health office for testing.)   We are performing limited curbside testing for healthcare/first responders and people with medical problems that put them at increased risk. It does not appear by the [facility] information you submitted that you meet any of these criteria. If there are medical problems that we did not know about, please repeat an [facility] visit and let us know what medical conditions you have.   How can I protect others?  Without a test, we can't know for sure that you have COVID-19. For safety, it's very important to follow these rules.  First, stay home and away from others (self-isolate) until:   You've had no  fever---and no medicine that reduces fever---for 3 full days (72 hours). And...    Your other symptoms have gotten better. For example, your cough or breathing has improved. And...   At least 10 days have passed since your symptoms started.   During this time:   Don't go to work, school or anywhere else.    Stay away from others in your home. No hugging, kissing or shaking hands.   Don't let anyone visit.   Cover your mouth and nose with a mask, tissue or wash cloth to avoid spreading germs.   Wash your hands and face often. Use soap and water.   How can I take care of myself?   1.Take Tylenol (acetaminophen) for fever or pain. If you have liver or kidney problems, ask your family doctor if it's okay to take Tylenol.        Adults can take either:    650 mg (two 325 mg pills) every 4 to 6 hours, or...   1,000 mg (two 500 mg pills) every 8 hours as needed.    Note: Don't take more than 3,000 mg in one day.   For children, check the Tylenol bottle for the right dose. The dose is based on the child's age or weight.   2.If you have other health problems (like cancer, heart failure, an organ transplant or severe kidney disease): Call your specialty clinic if you don't feel better in the next 2 days.       3.Know when to call 911: If your breathing is so bad that it keeps you from doing normal activities, call 911 or go to the emergency room. Tell them that you've been staying home and may have COVID-19.   Where can I get more information?  To learn more about COVID-19 and how to care for yourself at home, please visit the CDC website at https://www.cdc.gov/coronavirus/2019-ncov/about/steps-when-sick.html.  For more about your care at [facility], please visit [email or web address]   If you are interested in becoming part of a [facility] clinic trial related to COVID19 please go to [email or web address] for information, if you qualify.     Diagnosis: Other acute  sinusitis  Diagnosis ICD: J01.80  Triage Notes: I reviewed the patient's history, verified their identity, and explained the Visit process.    Spoke with patient, speaking in full sentences on the phone.  Synchronous Triage: phone, status: completed, duration: 244 seconds

## 2022-05-05 ENCOUNTER — HOSPITAL ENCOUNTER (EMERGENCY)
Facility: CLINIC | Age: 37
Discharge: HOME | End: 2022-05-05
Attending: EMERGENCY MEDICINE | Admitting: EMERGENCY MEDICINE
Payer: OTHER MISCELLANEOUS

## 2022-05-05 ENCOUNTER — APPOINTMENT (OUTPATIENT)
Dept: CT IMAGING | Facility: CLINIC | Age: 37
End: 2022-05-05
Payer: OTHER MISCELLANEOUS

## 2022-05-05 ENCOUNTER — APPOINTMENT (OUTPATIENT)
Dept: GENERAL RADIOLOGY | Facility: CLINIC | Age: 37
End: 2022-05-05
Attending: EMERGENCY MEDICINE
Payer: OTHER MISCELLANEOUS

## 2022-05-05 VITALS
DIASTOLIC BLOOD PRESSURE: 79 MMHG | WEIGHT: 150 LBS | HEART RATE: 110 BPM | TEMPERATURE: 99.2 F | HEIGHT: 61 IN | RESPIRATION RATE: 18 BRPM | SYSTOLIC BLOOD PRESSURE: 124 MMHG | BODY MASS INDEX: 28.32 KG/M2 | OXYGEN SATURATION: 100 %

## 2022-05-05 DIAGNOSIS — S82.122A CLOSED FRACTURE OF LATERAL PORTION OF LEFT TIBIAL PLATEAU, INITIAL ENCOUNTER: Primary | ICD-10-CM

## 2022-05-05 LAB — SARS-COV-2 RNA RESP QL NAA+PROBE: NEGATIVE

## 2022-05-05 PROCEDURE — 99285 EMERGENCY DEPT VISIT HI MDM: CPT | Mod: 25 | Performed by: EMERGENCY MEDICINE

## 2022-05-05 PROCEDURE — 96374 THER/PROPH/DIAG INJ IV PUSH: CPT | Mod: 59 | Performed by: EMERGENCY MEDICINE

## 2022-05-05 PROCEDURE — 73700 CT LOWER EXTREMITY W/O DYE: CPT | Mod: LT

## 2022-05-05 PROCEDURE — C9803 HOPD COVID-19 SPEC COLLECT: HCPCS | Performed by: EMERGENCY MEDICINE

## 2022-05-05 PROCEDURE — U0003 INFECTIOUS AGENT DETECTION BY NUCLEIC ACID (DNA OR RNA); SEVERE ACUTE RESPIRATORY SYNDROME CORONAVIRUS 2 (SARS-COV-2) (CORONAVIRUS DISEASE [COVID-19]), AMPLIFIED PROBE TECHNIQUE, MAKING USE OF HIGH THROUGHPUT TECHNOLOGIES AS DESCRIBED BY CMS-2020-01-R: HCPCS | Performed by: EMERGENCY MEDICINE

## 2022-05-05 PROCEDURE — 73700 CT LOWER EXTREMITY W/O DYE: CPT | Mod: 26 | Performed by: RADIOLOGY

## 2022-05-05 PROCEDURE — 73552 X-RAY EXAM OF FEMUR 2/>: CPT | Mod: 26 | Performed by: RADIOLOGY

## 2022-05-05 PROCEDURE — 73560 X-RAY EXAM OF KNEE 1 OR 2: CPT | Mod: 26 | Performed by: RADIOLOGY

## 2022-05-05 PROCEDURE — 250N000013 HC RX MED GY IP 250 OP 250 PS 637: Performed by: EMERGENCY MEDICINE

## 2022-05-05 PROCEDURE — 73590 X-RAY EXAM OF LOWER LEG: CPT | Mod: 26 | Performed by: RADIOLOGY

## 2022-05-05 PROCEDURE — 27530 TREAT KNEE FRACTURE: CPT | Mod: 54 | Performed by: EMERGENCY MEDICINE

## 2022-05-05 PROCEDURE — 72170 X-RAY EXAM OF PELVIS: CPT | Mod: 26 | Performed by: RADIOLOGY

## 2022-05-05 PROCEDURE — 250N000011 HC RX IP 250 OP 636: Performed by: EMERGENCY MEDICINE

## 2022-05-05 PROCEDURE — 96376 TX/PRO/DX INJ SAME DRUG ADON: CPT | Mod: 59 | Performed by: EMERGENCY MEDICINE

## 2022-05-05 PROCEDURE — 73552 X-RAY EXAM OF FEMUR 2/>: CPT | Mod: LT

## 2022-05-05 PROCEDURE — 73590 X-RAY EXAM OF LOWER LEG: CPT | Mod: LT

## 2022-05-05 PROCEDURE — 72170 X-RAY EXAM OF PELVIS: CPT

## 2022-05-05 PROCEDURE — 73560 X-RAY EXAM OF KNEE 1 OR 2: CPT | Mod: LT

## 2022-05-05 PROCEDURE — 27530 TREAT KNEE FRACTURE: CPT | Mod: LT | Performed by: EMERGENCY MEDICINE

## 2022-05-05 RX ORDER — OXYCODONE HYDROCHLORIDE 5 MG/1
5 TABLET ORAL ONCE
Status: COMPLETED | OUTPATIENT
Start: 2022-05-05 | End: 2022-05-05

## 2022-05-05 RX ORDER — SERTRALINE HYDROCHLORIDE 25 MG/1
TABLET, FILM COATED ORAL DAILY
COMMUNITY

## 2022-05-05 RX ORDER — OXYCODONE HYDROCHLORIDE 10 MG/1
10 TABLET ORAL ONCE
Status: DISCONTINUED | OUTPATIENT
Start: 2022-05-05 | End: 2022-05-05

## 2022-05-05 RX ORDER — OXYCODONE HYDROCHLORIDE 5 MG/1
5 TABLET ORAL EVERY 6 HOURS PRN
Qty: 20 TABLET | Refills: 0 | Status: SHIPPED | OUTPATIENT
Start: 2022-05-05 | End: 2022-05-12

## 2022-05-05 RX ADMIN — HYDROMORPHONE HYDROCHLORIDE 1 MG: 1 INJECTION, SOLUTION INTRAMUSCULAR; INTRAVENOUS; SUBCUTANEOUS at 15:11

## 2022-05-05 RX ADMIN — OXYCODONE HYDROCHLORIDE 5 MG: 5 TABLET ORAL at 14:58

## 2022-05-05 RX ADMIN — HYDROMORPHONE HYDROCHLORIDE 1 MG: 1 INJECTION, SOLUTION INTRAMUSCULAR; INTRAVENOUS; SUBCUTANEOUS at 17:12

## 2022-05-05 ASSESSMENT — ENCOUNTER SYMPTOMS
COUGH: 0
CONFUSION: 0
VOMITING: 0
FATIGUE: 0
NAUSEA: 0
WOUND: 0
PALPITATIONS: 0
COLOR CHANGE: 0
CONSTIPATION: 0
NECK PAIN: 0
WEAKNESS: 0
HEADACHES: 0
HEMATURIA: 0
BACK PAIN: 0
CHILLS: 0
LIGHT-HEADEDNESS: 0
BRUISES/BLEEDS EASILY: 0
RHINORRHEA: 0
SEIZURES: 0
ABDOMINAL PAIN: 0
DIZZINESS: 0
DIARRHEA: 0
SPEECH DIFFICULTY: 0
DYSURIA: 0
FREQUENCY: 0
SHORTNESS OF BREATH: 0
FLANK PAIN: 0
FEVER: 0

## 2022-05-05 NOTE — ED TRIAGE NOTES
PTA soda fountain rack fell on upper left leg and knee. Leg is swollen and painful.      Triage Assessment     Row Name 05/05/22 1240       Triage Assessment (Adult)    Airway WDL WDL    Additional Documentation Breath Sounds (Group)       Respiratory WDL    Respiratory WDL WDL

## 2022-05-05 NOTE — ED PROVIDER NOTES
ED Provider Note  [facility]      History     Chief Complaint   Patient presents with     Leg Injury     HPI  Minnie Arambula is a 36 year old female who presents for evaluation of left knee pain.  The patient ports that she was at work, a shelf fell onto her left side, striking her in the left knee.  She notes that she fell to the ground, did not hit her head or lose consciousness.  At this time she is having sharp pain most focal to the left knee, proximal tibia/fibula, and distal left femur.  She denies any other focal pain.  She notes some mild tingling in her toes throughout the left foot, denies weakness.  She notes that she scraped her right arm but denies any focal pain in that extremity.  She denies headache, vision changes, neck pain, back pain, chest pain, abdominal pain, or focal pain in her bilateral upper extremities or right lower extremity.  Denies being on any anticoagulation.  The patient denies any other physical concerns today.     Past Medical History  Past Medical History:   Diagnosis Date     Fibroid uterus      Giant cell tumor of bone     Left Femur in      Past Surgical History:   Procedure Laterality Date      SECTION      X4     LEG SURGERY      left     REMOVE HARDWARE LOWER EXTREMITY  2011    Procedure:REMOVE HARDWARE LOWER EXTREMITY; Plate Removal Distal Femur; Surgeon:YESY WALLS; [facility]     TUBAL LIGATION       sertraline (ZOLOFT) 25 MG tablet  ibuprofen (ADVIL,MOTRIN) 600 MG tablet      Allergies   Allergen Reactions     Pcn [Penicillins] Shortness Of Breath     Sulfa Drugs Rash     Family History  Family History   Problem Relation Age of Onset     Hypertension Mother      Diabetes Father      Social History   Social History     Tobacco Use     Smoking status: Never Smoker     Smokeless tobacco: Never Used   Substance Use Topics     Alcohol use: Yes     Comment: occ     Drug use: No      Past medical history,  "past surgical history, medications, allergies, family history, and social history were reviewed with the patient. No additional pertinent items.       Review of Systems   Constitutional: Negative for chills, fatigue and fever.   HENT: Negative for congestion, ear pain and rhinorrhea.    Eyes: Negative for visual disturbance.   Respiratory: Negative for cough and shortness of breath.    Cardiovascular: Negative for chest pain and palpitations.   Gastrointestinal: Negative for abdominal pain, constipation, diarrhea, nausea and vomiting.   Genitourinary: Negative for dysuria, flank pain, frequency, hematuria, vaginal bleeding and vaginal discharge.   Musculoskeletal: Positive for gait problem (Due to pain in left knee). Negative for back pain and neck pain.        + Left knee pain   Skin: Negative for color change, rash and wound.   Allergic/Immunologic: Negative for immunocompromised state.   Neurological: Negative for dizziness, seizures, syncope, speech difficulty, weakness, light-headedness and headaches.        + Tingling left toes   Hematological: Does not bruise/bleed easily.   Psychiatric/Behavioral: Negative for confusion.   All other systems reviewed and are negative.    A complete review of systems was performed with pertinent positives and negatives noted in the HPI, and all other systems negative.    Physical Exam   BP: 127/79  Pulse: 97  Resp: 16  Height: 154.9 cm (5' 1\")  Weight: 68 kg (150 lb)  SpO2: 100 %     Physical Exam  Vitals and nursing note reviewed.   Constitutional:       General: She is not in acute distress.     Appearance: She is normal weight. She is not ill-appearing, toxic-appearing or diaphoretic.   HENT:      Head: Normocephalic and atraumatic.      Right Ear: External ear normal.      Left Ear: External ear normal.      Nose: Nose normal.      Mouth/Throat:      Mouth: Mucous membranes are moist.   Eyes:      Extraocular Movements: Extraocular movements intact.      " Conjunctiva/sclera: Conjunctivae normal.      Pupils: Pupils are equal, round, and reactive to light.   Neck:      Comments: No midline tenderness to palpation of the cervical spine.  Cardiovascular:      Rate and Rhythm: Normal rate and regular rhythm.      Pulses: Normal pulses.      Heart sounds: Normal heart sounds. No murmur heard.    No friction rub. No gallop.      Comments: 2+ DP and PT pulse in the left foot/ankle.  Pulmonary:      Effort: Pulmonary effort is normal. No respiratory distress.      Breath sounds: Normal breath sounds. No stridor. No wheezing, rhonchi or rales.   Abdominal:      General: Bowel sounds are normal. There is no distension.      Palpations: Abdomen is soft.      Tenderness: There is no abdominal tenderness. There is no right CVA tenderness, left CVA tenderness, guarding or rebound.   Musculoskeletal:         General: Normal range of motion.      Cervical back: Normal range of motion and neck supple. No rigidity or tenderness.      Right lower leg: No edema.      Left lower leg: No edema.      Comments: Abrasion to the right forearm without any point tenderness or yuliet laceration.  Pain with range of motion of the left knee, with point tenderness throughout the left knee.  2+ DP and PT pulse in the left foot/ankle.  No midline tenderness to palpation of the thoracic or lumbar spine.  No pain with range of motion of all joints in bilateral upper extremities and right lower extremity.   Skin:     General: Skin is warm.      Capillary Refill: Capillary refill takes less than 2 seconds.   Neurological:      General: No focal deficit present.      Mental Status: She is alert.      Comments: GCS 15.  Cranial nerves II through XII grossly intact.  Strength 5/5 in all all distributions of the upper and lower extremities.  Intact sensation in all distributions of the bilateral upper and lower extremities.    Psychiatric:         Mood and Affect: Mood normal.         Behavior: Behavior  normal.         ED Course     ED Course as of 05/05/22 1733   Thu May 05, 2022   1450 XR Pelvis 1/2 Views  Acute intra-articular lateral tibial plateau fracture with  approximately 4 mm articular surface gap.   1450 XR Femur Left 2 Views  Acute intra-articular lateral tibial plateau fracture with  approximately 4 mm articular surface gap.   1450 XR Tibia & Fibula Left 2 Views  Acute intra-articular lateral tibial plateau fracture with  approximately 4 mm articular surface gap.    No additional fracture in the more distal tibia. No fracture in the  fibula. Metallic density projecting over the distal tibial shaft, only  seen on the frontal projection, possibly external to the patient.   1451 XR Knee Left 1/2 Views  Acute tibial plateau fracture with lateral tibial plateau articular  surface gap measuring approximately 4 mm and approximately 1 to 2 mm  articular surface step-off. Small-to-moderate knee joint effusion.   1452 Updated patient on x-ray findings and plan for orthopedics consult.   1656 CT Knee Left w/o Contrast  Depressed intra-articular fracture of the lateral tibial plateau.   1718 SARS CoV2 PCR: Negative     Procedures: none.        The medical record was reviewed and interpreted.  Current labs reviewed and interpreted.  Current images reviewed and interpreted: As above.  Managed outpatient prescription medications.    Medications   oxyCODONE (ROXICODONE) tablet 5 mg (5 mg Oral Given 5/5/22 1458)   HYDROmorphone (DILAUDID) injection 1 mg (1 mg Intravenous Given 5/5/22 1511)   HYDROmorphone (DILAUDID) injection 1 mg (1 mg Intravenous Given 5/5/22 1712)        Assessments & Plan (with Medical Decision Making)   Nursing notes and vital signs reviewed.     Presentation, exam, and workup is most consistent with closed left lateral tibial plateau fracture.    Please see HPI, ROS, exam, and ED course above for pertinent history and findings. In short, the patient presented to the ED for evaluation of left knee  pain after a shelf fell down onto the patient's left lower extremity at work.  On exam she has tenderness to palpation throughout the left knee; intact strength/sensation/circulation distally.  Denies concern for dislocation of the knee at any point.    X-ray shows lateral tibial plateau fracture.  Spoke with orthopedics who was consulted, they recommended CT which was performed here and shows depressed intra-articular fracture of the lateral tibial plateau.  Remainder of x-ray imaging of left lower extremity without acute fracture.  No other focal findings by history or on exam to indicate need for further imaging.    She was placed in a knee immobilizer, shown how to use crutches.  Given oxycodone and instructed not to drink/drive/operate heavy machinery while taking this medication as it can make her drowsy.  Discussed using scheduled Tylenol and only taking oxycodone as needed for breakthrough pain control.    Orthopedics states that their  should call the patient tomorrow to help her set up outpatient follow-up for next week to discuss surgical management.  The patient was given the phone number for the orthopedic clinic and instructed to call them if they do not call by tomorrow afternoon.  We also discussed strict return precautions in regards to the development of any compartment syndrome symptoms.  Initially the patient had noted some tingling in her left foot upon presentation, none such at discharge.  Intact CMS on exam.  No evidence for compartment syndrome at this time.    On re-evaluation, the patient is resting comfortably after the above interventions. I discussed with the patient the results of the above studies and she will be discharged to home with a prescription for oxycodone. All questions were answered prior to discharge, and the patient was told to follow up with orthopedics per discharge instructions. Reasons for return as well as follow up were reviewed with the patient.  The  patient expressed understanding and agreement.    Disposition: The patient was discharged to home in stable condition.      New Prescriptions    OXYCODONE (ROXICODONE) 5 MG TABLET    Take 1 tablet (5 mg) by mouth every 6 hours as needed for severe pain       Final diagnoses:   Closed fracture of lateral portion of left tibial plateau, initial encounter       --  Brittney Sweeney MD   [facility] EMERGENCY DEPARTMENT  5/5/2022     Brittney Sweeney MD  05/05/22 1732

## 2022-05-05 NOTE — Clinical Note
Minnie Arambula was seen and treated in our emergency department on 5/5/2022.  She may return to work on 05/11/2022.       If you have any questions or concerns, please don't hesitate to call.      Brittney Sweeney MD

## 2022-05-06 ENCOUNTER — TELEPHONE (OUTPATIENT)
Dept: ORTHOPEDICS | Facility: CLINIC | Age: 37
End: 2022-05-06
Payer: COMMERCIAL

## 2022-05-06 NOTE — TELEPHONE ENCOUNTER
ATC called and spoke to pt. Pt confirmed appt date/time/location. All questions answered. ATC mailed appt reminder to address on file.     Ashley Lyons ATC

## 2022-05-06 NOTE — TELEPHONE ENCOUNTER
----- Message from Roger Terrazas RN sent at 5/6/2022 10:38 AM CDT -----  Regarding: RE: Follow up next week  Could you see if she can come in at 10:40 AM.    Thanks!  ----- Message -----  From: Ashley Hoyos  Sent: 5/6/2022  10:36 AM CDT  To: Roger Terrazas RN, Musa Jimenez, ATC  Subject: FW: Follow up next week                            ----- Message -----  From: Mahendra Siegel MD  Sent: 5/6/2022   8:53 AM CDT  To: [facility]  Subject: Follow up next week                              Please schedule with Dr Niño next week    Thank you    Will

## 2022-05-10 NOTE — TELEPHONE ENCOUNTER
FUTURE VISIT INFORMATION      FUTURE VISIT INFORMATION:    Date: 5/13/2022    Time: 1040am    [place] [facility]  [place] INFORMATION:    Referring provider:  Dr. Sweeney    Referring providers clinic:  [place] [facility]     Reason for visit/diagnosis  ED Follow-up     RECORDS REQUESTED FROM:       Clinic name Comments Records Status Imaging Status   Internal ED Visit-5/5/2022    CT Left Knee-5/5/2022    XR Knee-5/5/2022    XR FIb/Tib-5/5/2022    XR Pelvis-5/5/2022    XR Femur-5/5/2022 Epic PACS

## 2022-05-13 ENCOUNTER — OFFICE VISIT (OUTPATIENT)
Dept: ORTHOPEDICS | Facility: CLINIC | Age: 37
End: 2022-05-13
Payer: OTHER MISCELLANEOUS

## 2022-05-13 ENCOUNTER — PRE VISIT (OUTPATIENT)
Dept: ORTHOPEDICS | Facility: CLINIC | Age: 37
End: 2022-05-13
Payer: COMMERCIAL

## 2022-05-13 VITALS — WEIGHT: 150 LBS | BODY MASS INDEX: 28.32 KG/M2 | HEIGHT: 61 IN

## 2022-05-13 DIAGNOSIS — S82.142A CLOSED FRACTURE OF LEFT TIBIAL PLATEAU, INITIAL ENCOUNTER: Primary | ICD-10-CM

## 2022-05-13 PROCEDURE — 99203 OFFICE O/P NEW LOW 30 MIN: CPT | Performed by: ORTHOPAEDIC SURGERY

## 2022-05-13 RX ORDER — HYDROCODONE BITARTRATE AND ACETAMINOPHEN 5; 325 MG/1; MG/1
1 TABLET ORAL EVERY 6 HOURS PRN
Qty: 30 TABLET | Refills: 0 | Status: SHIPPED | OUTPATIENT
Start: 2022-05-13

## 2022-05-13 NOTE — NURSING NOTE
"Reason For Visit:   Chief Complaint   Patient presents with     RECHECK     ED follow up closed fracture of lateral portion of left tibial plateau, patient had a storage rack fall on her. Patient rates the pain at about 4/10 with 10 being the worst.        Ht 1.549 m (5' 1\")   Wt 68 kg (150 lb)   BMI 28.34 kg/m           Musa Jimenez ATC  "

## 2022-05-13 NOTE — LETTER
2022         RE: Minnie Arambula  [address]  [facility] [address]        Dear Colleague,    Thank you for referring your patient, Minnie Arambula, to the [facility] [place]. Please see a copy of my visit note below.    S: Patient sustained left lateral tibial plateau fracture over a week ago.  Some pain, presents in knee immobilizer.         Patient Active Problem List   Diagnosis     Ruptured ovarian cyst     Fibroid uterus     Flu-like symptoms            Past Medical History:   Diagnosis Date     Fibroid uterus      Giant cell tumor of bone     Left Femur in             Past Surgical History:   Procedure Laterality Date      SECTION      X4     LEG SURGERY      left     REMOVE HARDWARE LOWER EXTREMITY  2011    Procedure:REMOVE HARDWARE LOWER EXTREMITY; Plate Removal Distal Femur; Surgeon:YESY WALLS; Location:[facility]     TUBAL LIGATION              Social History     Tobacco Use     Smoking status: Never Smoker     Smokeless tobacco: Never Used   Substance Use Topics     Alcohol use: Yes     Comment: occ            Family History   Problem Relation Age of Onset     Hypertension Mother      Diabetes Father                Allergies   Allergen Reactions     Pcn [Penicillins] Shortness Of Breath     Sulfa Drugs Rash            Current Outpatient Medications   Medication Sig Dispense Refill     HYDROcodone-acetaminophen (NORCO) 5-325 MG tablet Take 1 tablet by mouth every 6 hours as needed for severe pain 30 tablet 0     ibuprofen (ADVIL,MOTRIN) 600 MG tablet Take 1 tablet (600 mg) by mouth every 8 hours as needed for moderate pain 20 tablet 0     sertraline (ZOLOFT) 25 MG tablet Take by mouth daily            Review Of Systems  Skin: negative  Eyes: negative  Ears/Nose/Throat: negative  Respiratory: No shortness of breath, dyspnea on exertion, cough, or hemoptysis    O: Physical exam:  CMS intact to LLE.  Effusion L  knee.  Pain to palpation and with flexion/extension.    Images: Findings:     AP view of pelvis were obtained.     No acute osseous abnormality.     Hip joint spaces are preserved bilaterally.     AP and lateral  views of the left femur were obtained.      No acute osseous abnormality.       Healed fracture deformity of the left distal femur with cement  material. Since comparison, interval removal of previous surgical  fixation hardware.     AP and crosstable lateral view of the left knee were obtained.     Acute tibial plateau fracture with lateral tibial plateau articular  surface gap measuring approximately 4 mm and approximately 1 to 2 mm  articular surface step-off. Small-to-moderate knee joint effusion.     AP and crosstable lateral views of the left tibia/fibular were  obtained.     No additional fracture in the more distal tibia. No fracture in the  fibula. Metallic density projecting over the distal tibial shaft, only  seen on the frontal projection, possibly external to the patient.                                                                      Impression: Acute intra-articular lateral tibial plateau fracture with  approximately 4 mm articular surface gap.       FINDINGS:       images: Left tibial plateau fracture better detailed on CT  images. Presumed cement within the distal left femur.     Similar to radiographs same day, there is a depressed intra-articular  fracture of the lateral tibial plateau. Articular surface depression  measures up to 4 mm on coronal series 4 image 74. Vertical fracture  line extends into the lateral aspect of the proximal tibial  metaphysis.     Proximal fibula appears intact. Curettage and cementation changes of  the distal femur related to prior joints all tumor of bone. Screw  lucencies through the distal femur as well.     Patellofemoral spurring without tilt or subluxation. Lateral  compartment an medial compartment degenerative spurring.     CT is limited for  evaluation of internal derangement. Moderate joint  effusion with lipohemarthrosis. Normal muscle bulk.                                                                       IMPRESSION: Depressed intra-articular fracture of the lateral tibial  plateau.        A:  Stable lateral tibial plateau fx LLE.  Hx multiple surgeries for femoral tumor.    P:  Discussed op vs non op.  Will proceed with conservative care.  Hinged knee brace 0-60 degrees.  Motion okay but no weight bearing x 3 mos.  See back 6 weeks with new images.  Ice, elevate, pain medication as needed.  Notify if exacerbation symptoms.           In addition to the above assessment and plan each active problem on Vera's problem list was evaluated today. This included the questioning of Minnie for any medication problems. We will continue the current treatment plan for these active problems except as noted.      NICA SALOMON MD

## 2022-05-13 NOTE — PROGRESS NOTES
S: Patient sustained left lateral tibial plateau fracture over a week ago.  Some pain, presents in knee immobilizer.         Patient Active Problem List   Diagnosis     Ruptured ovarian cyst     Fibroid uterus     Flu-like symptoms            Past Medical History:   Diagnosis Date     Fibroid uterus      Giant cell tumor of bone     Left Femur in             Past Surgical History:   Procedure Laterality Date      SECTION      X4     LEG SURGERY      left     REMOVE HARDWARE LOWER EXTREMITY  2011    Procedure:REMOVE HARDWARE LOWER EXTREMITY; Plate Removal Distal Femur; Surgeon:YESY WALLS; Location:[facility]     TUBAL LIGATION              Social History     Tobacco Use     Smoking status: Never Smoker     Smokeless tobacco: Never Used   Substance Use Topics     Alcohol use: Yes     Comment: occ            Family History   Problem Relation Age of Onset     Hypertension Mother      Diabetes Father                Allergies   Allergen Reactions     Pcn [Penicillins] Shortness Of Breath     Sulfa Drugs Rash            Current Outpatient Medications   Medication Sig Dispense Refill     HYDROcodone-acetaminophen (NORCO) 5-325 MG tablet Take 1 tablet by mouth every 6 hours as needed for severe pain 30 tablet 0     ibuprofen (ADVIL,MOTRIN) 600 MG tablet Take 1 tablet (600 mg) by mouth every 8 hours as needed for moderate pain 20 tablet 0     sertraline (ZOLOFT) 25 MG tablet Take by mouth daily            Review Of Systems  Skin: negative  Eyes: negative  Ears/Nose/Throat: negative  Respiratory: No shortness of breath, dyspnea on exertion, cough, or hemoptysis    O: Physical exam:  CMS intact to LLE.  Effusion L knee.  Pain to palpation and with flexion/extension.    Images: Findings:     AP view of pelvis were obtained.     No acute osseous abnormality.     Hip joint spaces are preserved bilaterally.     AP and lateral  views of the left femur were obtained.      No acute osseous abnormality.        Healed fracture deformity of the left distal femur with cement  material. Since comparison, interval removal of previous surgical  fixation hardware.     AP and crosstable lateral view of the left knee were obtained.     Acute tibial plateau fracture with lateral tibial plateau articular  surface gap measuring approximately 4 mm and approximately 1 to 2 mm  articular surface step-off. Small-to-moderate knee joint effusion.     AP and crosstable lateral views of the left tibia/fibular were  obtained.     No additional fracture in the more distal tibia. No fracture in the  fibula. Metallic density projecting over the distal tibial shaft, only  seen on the frontal projection, possibly external to the patient.                                                                      Impression: Acute intra-articular lateral tibial plateau fracture with  approximately 4 mm articular surface gap.       FINDINGS:       images: Left tibial plateau fracture better detailed on CT  images. Presumed cement within the distal left femur.     Similar to radiographs same day, there is a depressed intra-articular  fracture of the lateral tibial plateau. Articular surface depression  measures up to 4 mm on coronal series 4 image 74. Vertical fracture  line extends into the lateral aspect of the proximal tibial  metaphysis.     Proximal fibula appears intact. Curettage and cementation changes of  the distal femur related to prior joints all tumor of bone. Screw  lucencies through the distal femur as well.     Patellofemoral spurring without tilt or subluxation. Lateral  compartment an medial compartment degenerative spurring.     CT is limited for evaluation of internal derangement. Moderate joint  effusion with lipohemarthrosis. Normal muscle bulk.                                                                       IMPRESSION: Depressed intra-articular fracture of the lateral tibial  plateau.        A:  Stable lateral tibial  plateau fx LLE.  Hx multiple surgeries for femoral tumor.    P:  Discussed op vs non op.  Will proceed with conservative care.  Hinged knee brace 0-60 degrees.  Motion okay but no weight bearing x 3 mos.  See back 6 weeks with new images.  Ice, elevate, pain medication as needed.  Notify if exacerbation symptoms.           In addition to the above assessment and plan each active problem on Minnie's problem list was evaluated today. This included the questioning of Minnie for any medication problems. We will continue the current treatment plan for these active problems except as noted.

## 2022-05-13 NOTE — LETTER
[facility] [address]  [address]  [facility] [id number] [phone]        May 13, 2022    Regarding:  Minnie Johnson Reinaldo Arambula  [address]  [facility] [address]              To Whom It May Concern;     Minnie will need to be non-weight bearing of left leg for three months (5/5/2022-8/8/2022). Please accommodate this need. If unable to accommodate, please excuse Minnie from work during this time. We will follow-up with Shawnamaria guadalupe on 6/24/22 where further restrictions will be assessed.       Sincerely,      NICA SALOMON MD

## 2022-05-16 ENCOUNTER — MYC MEDICAL ADVICE (OUTPATIENT)
Dept: ORTHOPEDICS | Facility: CLINIC | Age: 37
End: 2022-05-16
Payer: COMMERCIAL

## 2022-05-16 ENCOUNTER — TELEPHONE (OUTPATIENT)
Dept: ORTHOPEDICS | Facility: CLINIC | Age: 37
End: 2022-05-16
Payer: COMMERCIAL

## 2022-05-16 NOTE — TELEPHONE ENCOUNTER
[facility] Call Center    Phone Message    May a detailed message be left on voicemail: yes     Reason for Call: Form or Letter   Type or form/letter needing completion: Would like a new doctor's note to be excused from work for the next 3 months.  Provider: Johnson Niño  Date form needed: asap  Once completed: email   Action Taken: Message routed to:  [facility] ([facility] Orthopedics    Travel Screening: Not Applicable

## 2022-05-17 ENCOUNTER — DOCUMENTATION ONLY (OUTPATIENT)
Dept: EMERGENCY MEDICINE | Facility: CLINIC | Age: 37
End: 2022-05-17
Payer: COMMERCIAL

## 2022-05-17 DIAGNOSIS — S82.122A CLOSED FRACTURE OF LATERAL PORTION OF LEFT TIBIAL PLATEAU, INITIAL ENCOUNTER: Primary | ICD-10-CM

## 2022-05-17 NOTE — TELEPHONE ENCOUNTER
Updated workability letter faxed to Taryn at number below and scanned to pt email    Brittany Bell

## 2022-05-17 NOTE — TELEPHONE ENCOUNTER
Letter complete allowing pt to perform seated work only 3 months  Scanned to email    Brittany Bell

## 2022-05-17 NOTE — TELEPHONE ENCOUNTER
Reason for call:  Would like to discuss patient's workability. 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     Taryn: [phone]

## 2022-06-16 DIAGNOSIS — S82.142A CLOSED FRACTURE OF LEFT TIBIAL PLATEAU, INITIAL ENCOUNTER: Primary | ICD-10-CM

## 2022-06-25 ENCOUNTER — HEALTH MAINTENANCE LETTER (OUTPATIENT)
Age: 37
End: 2022-06-25

## 2022-07-01 ENCOUNTER — ANCILLARY PROCEDURE (OUTPATIENT)
Dept: GENERAL RADIOLOGY | Facility: CLINIC | Age: 37
End: 2022-07-01
Attending: ORTHOPAEDIC SURGERY
Payer: OTHER MISCELLANEOUS

## 2022-07-01 ENCOUNTER — OFFICE VISIT (OUTPATIENT)
Dept: ORTHOPEDICS | Facility: CLINIC | Age: 37
End: 2022-07-01
Payer: OTHER MISCELLANEOUS

## 2022-07-01 DIAGNOSIS — S82.142A CLOSED FRACTURE OF LEFT TIBIAL PLATEAU, INITIAL ENCOUNTER: ICD-10-CM

## 2022-07-01 DIAGNOSIS — S82.142A CLOSED FRACTURE OF LEFT TIBIAL PLATEAU, INITIAL ENCOUNTER: Primary | ICD-10-CM

## 2022-07-01 PROCEDURE — 99212 OFFICE O/P EST SF 10 MIN: CPT | Performed by: ORTHOPAEDIC SURGERY

## 2022-07-01 PROCEDURE — 73590 X-RAY EXAM OF LOWER LEG: CPT | Mod: LT | Performed by: RADIOLOGY

## 2022-07-01 NOTE — NURSING NOTE
Reason For Visit:   Chief Complaint   Patient presents with     RECHECK     Followup left tibial plateau fracture DOI 5/5/22       There were no vitals taken for this visit.    Pain Assessment  Patient Currently in Pain: Denies (no pain per pt)        Shilo Talavera ATC

## 2022-07-01 NOTE — LETTER
[facility] [address]  [address] [facility] [address]  [phone]        July 1, 2022    Regarding:  Minnie Johnson Reinaldo Arambula  [address]  [facility] [address]              To Whom It May Concern;    Minnie may return to work without restrictions.       Sincerely,      NICA SALOMON MD

## 2022-07-01 NOTE — LETTER
2022         RE: Minnie Arambula  [address]  [facility] [address]        Dear Colleague,    Thank you for referring your patient, Minnie Arambula, to the [facility] [place]. Please see a copy of my visit note below.    S:  Patient states she is doing well and in fact has been weight bearing on lower extremity without issue.         Patient Active Problem List   Diagnosis     Ruptured ovarian cyst     Fibroid uterus     Flu-like symptoms            Past Medical History:   Diagnosis Date     Fibroid uterus      Giant cell tumor of bone     Left Femur in             Past Surgical History:   Procedure Laterality Date      SECTION      X4     LEG SURGERY      left     REMOVE HARDWARE LOWER EXTREMITY  2011    Procedure:REMOVE HARDWARE LOWER EXTREMITY; Plate Removal Distal Femur; Surgeon:YESY WALLS; Location:[facility]     TUBAL LIGATION              Social History     Tobacco Use     Smoking status: Never Smoker     Smokeless tobacco: Never Used   Substance Use Topics     Alcohol use: Yes     Comment: occ            Family History   Problem Relation Age of Onset     Hypertension Mother      Diabetes Father                Allergies   Allergen Reactions     Pcn [Penicillins] Shortness Of Breath     Sulfa Drugs Rash            Current Outpatient Medications   Medication Sig Dispense Refill     HYDROcodone-acetaminophen (NORCO) 5-325 MG tablet Take 1 tablet by mouth every 6 hours as needed for severe pain 30 tablet 0     ibuprofen (ADVIL,MOTRIN) 600 MG tablet Take 1 tablet (600 mg) by mouth every 8 hours as needed for moderate pain 20 tablet 0     sertraline (ZOLOFT) 25 MG tablet Take by mouth daily            Review Of Systems  Skin: negative  Eyes: negative  Ears/Nose/Throat: negative  Respiratory: No shortness of breath, dyspnea on exertion, cough, or hemoptysis    O: Physical exam:  No tenderness to palpation about medial or  lateral joint line.  Adequate knee flexion and extension.  No evidence instability.  CMS intact.       Images:     Findings:     AP, lateral and patellofemoral views of the left knee were obtained.  AP and lateral views of the left leg.     Redemonstration of this process fracture of the lateral tibial  plateau. Alignment appears similar to prior with decreased fracture  lucency conspicuity. Cementation and posttraumatic changes of the  distal femur similar to prior. No substantial joint effusion.  Tricompartmental degenerative spurring.     No patellar tilt or lateral subluxation.  Soft tissue is unremarkable.     Left ankle appears congruent without substantial degenerative change.                                                                      Impression:  Healing lateral tibial plateau fracture in stable alignment.    A:  Healing tibial plateau fracture    P:  Advance activities with caution.  If exacerbation will let us know.  See back 3-6 months with new images L proximal tibial plateau.             In addition to the above assessment and plan each active problem on Vera's problem list was evaluated today. This included the questioning of Minnie for any medication problems. We will continue the current treatment plan for these active problems except as noted.        NICA SALOMON MD

## 2022-07-07 NOTE — PROGRESS NOTES
S:  Patient states she is doing well and in fact has been weight bearing on lower extremity without issue.         Patient Active Problem List   Diagnosis     Ruptured ovarian cyst     Fibroid uterus     Flu-like symptoms            Past Medical History:   Diagnosis Date     Fibroid uterus      Giant cell tumor of bone     Left Femur in             Past Surgical History:   Procedure Laterality Date      SECTION      X4     LEG SURGERY      left     REMOVE HARDWARE LOWER EXTREMITY  2011    Procedure:REMOVE HARDWARE LOWER EXTREMITY; Plate Removal Distal Femur; Surgeon:YESY WALLS; Location:[facility]     TUBAL LIGATION              Social History     Tobacco Use     Smoking status: Never Smoker     Smokeless tobacco: Never Used   Substance Use Topics     Alcohol use: Yes     Comment: occ            Family History   Problem Relation Age of Onset     Hypertension Mother      Diabetes Father                Allergies   Allergen Reactions     Pcn [Penicillins] Shortness Of Breath     Sulfa Drugs Rash            Current Outpatient Medications   Medication Sig Dispense Refill     HYDROcodone-acetaminophen (NORCO) 5-325 MG tablet Take 1 tablet by mouth every 6 hours as needed for severe pain 30 tablet 0     ibuprofen (ADVIL,MOTRIN) 600 MG tablet Take 1 tablet (600 mg) by mouth every 8 hours as needed for moderate pain 20 tablet 0     sertraline (ZOLOFT) 25 MG tablet Take by mouth daily            Review Of Systems  Skin: negative  Eyes: negative  Ears/Nose/Throat: negative  Respiratory: No shortness of breath, dyspnea on exertion, cough, or hemoptysis    O: Physical exam:  No tenderness to palpation about medial or lateral joint line.  Adequate knee flexion and extension.  No evidence instability.  CMS intact.       Images:     Findings:     AP, lateral and patellofemoral views of the left knee were obtained.  AP and lateral views of the left leg.     Redemonstration of this process fracture of the  lateral tibial  plateau. Alignment appears similar to prior with decreased fracture  lucency conspicuity. Cementation and posttraumatic changes of the  distal femur similar to prior. No substantial joint effusion.  Tricompartmental degenerative spurring.     No patellar tilt or lateral subluxation.  Soft tissue is unremarkable.     Left ankle appears congruent without substantial degenerative change.                                                                      Impression:  Healing lateral tibial plateau fracture in stable alignment.    A:  Healing tibial plateau fracture    P:  Advance activities with caution.  If exacerbation will let us know.  See back 3-6 months with new images L proximal tibial plateau.             In addition to the above assessment and plan each active problem on Vera's problem list was evaluated today. This included the questioning of Minnie for any medication problems. We will continue the current treatment plan for these active problems except as noted.

## 2022-07-26 ENCOUNTER — MYC REFILL (OUTPATIENT)
Dept: ORTHOPEDICS | Facility: CLINIC | Age: 37
End: 2022-07-26

## 2022-07-26 DIAGNOSIS — S82.142A CLOSED FRACTURE OF LEFT TIBIAL PLATEAU, INITIAL ENCOUNTER: Primary | ICD-10-CM

## 2022-07-26 RX ORDER — MELOXICAM 7.5 MG/1
7.5 TABLET ORAL DAILY
Qty: 30 TABLET | Refills: 1 | Status: SHIPPED | OUTPATIENT
Start: 2022-07-26

## 2022-07-26 NOTE — TELEPHONE ENCOUNTER
Pt states pain is still present and Tylenol makes stomach upset. Pt asked to restart norco, MD refused. Offered mobic, pt agreed.     Roger Terrazas RNCC

## 2022-11-20 ENCOUNTER — HEALTH MAINTENANCE LETTER (OUTPATIENT)
Age: 37
End: 2022-11-20

## 2023-07-08 ENCOUNTER — HEALTH MAINTENANCE LETTER (OUTPATIENT)
Age: 38
End: 2023-07-08

## 2024-08-25 ENCOUNTER — HEALTH MAINTENANCE LETTER (OUTPATIENT)
Age: 39
End: 2024-08-25